# Patient Record
Sex: FEMALE | Race: BLACK OR AFRICAN AMERICAN | NOT HISPANIC OR LATINO | Employment: FULL TIME | URBAN - METROPOLITAN AREA
[De-identification: names, ages, dates, MRNs, and addresses within clinical notes are randomized per-mention and may not be internally consistent; named-entity substitution may affect disease eponyms.]

---

## 2017-09-20 ENCOUNTER — CLINICAL SUPPORT (OUTPATIENT)
Dept: URGENT CARE | Facility: CLINIC | Age: 22
End: 2017-09-20

## 2017-09-20 DIAGNOSIS — Z02.83 ENCOUNTER FOR DRUG SCREENING: Primary | ICD-10-CM

## 2017-09-20 PROCEDURE — 80306 DRUG TEST PRSMV INSTRMNT: CPT | Mod: S$GLB,,, | Performed by: PREVENTIVE MEDICINE

## 2019-09-04 ENCOUNTER — OFFICE VISIT (OUTPATIENT)
Dept: PLASTIC SURGERY | Facility: CLINIC | Age: 24
End: 2019-09-04
Payer: COMMERCIAL

## 2019-09-04 VITALS
SYSTOLIC BLOOD PRESSURE: 139 MMHG | DIASTOLIC BLOOD PRESSURE: 82 MMHG | WEIGHT: 255 LBS | BODY MASS INDEX: 43.54 KG/M2 | HEIGHT: 64 IN

## 2019-09-04 DIAGNOSIS — G89.29 CHRONIC NECK AND BACK PAIN: ICD-10-CM

## 2019-09-04 DIAGNOSIS — M54.2 CHRONIC NECK AND BACK PAIN: ICD-10-CM

## 2019-09-04 DIAGNOSIS — R21 RASH AND NONSPECIFIC SKIN ERUPTION: ICD-10-CM

## 2019-09-04 DIAGNOSIS — M54.9 CHRONIC UPPER BACK PAIN: ICD-10-CM

## 2019-09-04 DIAGNOSIS — N62 MACROMASTIA: Primary | ICD-10-CM

## 2019-09-04 DIAGNOSIS — Z3A.01 LESS THAN 8 WEEKS GESTATION OF PREGNANCY: ICD-10-CM

## 2019-09-04 DIAGNOSIS — M54.2 CHRONIC NECK PAIN: ICD-10-CM

## 2019-09-04 DIAGNOSIS — R21 EXCORIATED RASH: ICD-10-CM

## 2019-09-04 DIAGNOSIS — G89.29 CHRONIC NECK PAIN: ICD-10-CM

## 2019-09-04 DIAGNOSIS — M54.9 CHRONIC NECK AND BACK PAIN: ICD-10-CM

## 2019-09-04 DIAGNOSIS — G89.29 CHRONIC UPPER BACK PAIN: ICD-10-CM

## 2019-09-04 PROCEDURE — 99203 OFFICE O/P NEW LOW 30 MIN: CPT | Mod: S$GLB,,, | Performed by: SURGERY

## 2019-09-04 PROCEDURE — 99999 PR PBB SHADOW E&M-EST. PATIENT-LVL II: CPT | Mod: PBBFAC,,, | Performed by: SURGERY

## 2019-09-04 PROCEDURE — 99203 PR OFFICE/OUTPT VISIT, NEW, LEVL III, 30-44 MIN: ICD-10-PCS | Mod: S$GLB,,, | Performed by: SURGERY

## 2019-09-04 PROCEDURE — 99999 PR PBB SHADOW E&M-EST. PATIENT-LVL II: ICD-10-PCS | Mod: PBBFAC,,, | Performed by: SURGERY

## 2019-09-04 RX ORDER — NIFEDIPINE 30 MG/1
30 TABLET, FILM COATED, EXTENDED RELEASE ORAL DAILY
COMMUNITY
End: 2020-09-29 | Stop reason: SDUPTHER

## 2019-09-04 NOTE — PROGRESS NOTES
History & Physical  Plastic and Reconstructive Surgery  Breast Reduction Consult    SUBJECTIVE:   Chief complaint: large breasts    History of Present Illness:  Patient is a 23 y.o. female presents with symptomatic bilateral large pendulous  breasts.  Patient wears a 42 H bra size. States that she has back and neck pain for greater than 10 years. Patient has tried to treat back pain with the following: muscle relaxants, ibuprofen, and tylenol for this. Patient denies improvement with medication management of back pain.    Patient states she is active but her breast do prevent her from engaging in aerobic exercise. She also participates in exercise program to relieve back and neck pain. Denies improvement. Patient complains of shoulder grooving from kaykay straps and rashes under the breast for which she uses baby powder and cream, and triple oitment abx.   Also has macerating rashes during summer time. Patient wears special supportive bra's with wide straps. Patient reports her large breast and back pain affect her personal and professional life on a daily basis. Patient states she is in constat pain through out the work day.    Patient reports she found out she is pregnant earlier this month. She wanted to come in for the consult to discuss surgical options that would be available in the future. She understands she cannot have the surgery until she is finished breast feeding and should consider waiting until she is finished having children.    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 No pertinent past medical history.    History reviewed. No pertinent surgical history.    Family History   Problem Relation Age of Onset    Depression Mother     Hypertension Father     Diabetes Father         Social History     Socioeconomic History    Marital status: Single     Spouse name: Not on file    Number of children: Not on file    Years of education: Not on file    Highest education level: Not on file   Occupational History    Occupation: security at [employer]   Social Needs    Financial resource strain: Not on file    Food insecurity:     Worry: Not on file     Inability: Not on file    Transportation needs:     Medical: Not on file     Non-medical: Not on file   Tobacco Use    Smoking status: Never Smoker    Smokeless tobacco: Never Used   Substance and Sexual Activity    Alcohol use: Yes     Comment: occasionally    Drug use: No    Sexual activity: Not Currently   Lifestyle    Physical activity:     Days per week: Not on file     Minutes per session: Not on file    Stress: Not on file   Relationships    Social connections:     Talks on phone: Not on file     Gets together: Not on file     Attends [employer]ology service: Not on file     Active member of club or organization: Not on file     Attends meetings of clubs or organizations: Not on file     Relationship status: Not on file   Other Topics Concern    Not on file   Social History Narrative    Not on file       Current Outpatient Medications   Medication Sig Dispense Refill    NIFEdipine (ADALAT CC) 30 MG TbSR Take 30 mg by mouth once daily.      cetirizine (ZYRTEC) 10 MG tablet Take 10 mg by mouth once daily.      ferrous gluconate (FERGON) 325 MG Tab Take 1 tablet (325 mg total) by mouth 3 (three) times daily with meals. 90 tablet 6    fluoxetine (PROZAC) 40 MG capsule Take 1 capsule (40 mg total) by mouth once daily. 30 capsule 3     No current facility-administered medications for this visit.        Review of patient's allergies indicates:   Allergen Reactions    No known allergies          Review of Systems:    Review of Systems   HENT: Positive for neck pain.    Musculoskeletal: Positive for back pain.   Neurological:  "Positive for headaches. dizziness  Skin: Positive for rashes    Pertinent 12 point ROS negative except as listed above. She denies history of fatigue, weight change, eye problems, ear problems, hoarseness/voice change, chest discomfort, palpitations, vein inflammation, swollen feet, breathing problems, chronic cough, indigestion, trouble swallowing, abdominal pain, blood in stool, urinary problems, sexual problems, joint problems, back pain, musculoskeletal pain, dizziness, headaches, muscle weakness, trouble sleeping, abnormal bruising, abnormal thirst, abnormal sweating, depression, anxiety, or any prior psychiatry consultation.        OBJECTIVE:     /82   Ht 5' 4" (1.626 m)   Wt 115.7 kg (255 lb)   BMI 43.77 kg/m²     Physical Exam:    Physical Exam   Constitutional: She is oriented to person, place, and time. She appears well-developed and well-nourished.   Neck: Normal range of motion. Neck supple. No tracheal deviation present.   Cardiovascular: Normal rate, regular rhythm and normal heart sounds.    Pulmonary/Chest: Effort normal and breath sounds normal. bilaterally enlarged breasts, evidence of previous rashes, shoulder grooving, no palpable masses, nipple everted  Abdominal: Soft. Bowel sounds are normal.   Musculoskeletal: Normal range of motion.   Neurological: She is alert and oriented to person, place, and time.   Skin: Skin is warm.       Labs:    No results found for: HGBA1C      Lab Results   Component Value Date    WBC 5.39 03/16/2015    HGB 11.9 (L) 03/16/2015    HCT 34.4 (L) 03/16/2015    MCV 81 (L) 03/16/2015     03/16/2015     CMP  BMP  Lab Results   Component Value Date     03/10/2015    K 3.8 03/10/2015     03/10/2015    CO2 22 (L) 03/10/2015    BUN 11 03/10/2015    CREATININE 0.7 03/10/2015    CALCIUM 9.9 03/10/2015    ANIONGAP 11 03/10/2015    ESTGFRAFRICA >60.0 03/10/2015    EGFRNONAA >60.0 03/10/2015     Lab Results   Component Value Date    ALT 15 03/10/2015 "    AST 24 03/10/2015    ALKPHOS 47 (L) 03/10/2015    BILITOT 0.2 03/10/2015     No results found for: INR, PROTIME      ASSESSMENT/PLAN:     1.Symptomatic Bilateral Macromastia  2. Chronic neck pain  3. Chronic back pain  4. Chronic breast rashes  5. Pregnancy, 1st trimester    PLAN:  -Will need 1800 gm reduction per side  - Continue exercise plan and medication management for back pain  - Continue local skin care regimen for rashes  - Follow up with plastic surgery after patient has stopped breast feeding for at least 6 months.

## 2019-09-17 ENCOUNTER — OFFICE VISIT (OUTPATIENT)
Dept: OBSTETRICS AND GYNECOLOGY | Facility: CLINIC | Age: 24
End: 2019-09-17
Payer: COMMERCIAL

## 2019-09-17 VITALS
BODY MASS INDEX: 43.13 KG/M2 | WEIGHT: 252.63 LBS | SYSTOLIC BLOOD PRESSURE: 116 MMHG | HEIGHT: 64 IN | DIASTOLIC BLOOD PRESSURE: 80 MMHG

## 2019-09-17 DIAGNOSIS — F32.A DEPRESSION, UNSPECIFIED DEPRESSION TYPE: ICD-10-CM

## 2019-09-17 DIAGNOSIS — O02.1 MISSED AB: Primary | ICD-10-CM

## 2019-09-17 PROCEDURE — 99204 PR OFFICE/OUTPT VISIT, NEW, LEVL IV, 45-59 MIN: ICD-10-PCS | Mod: S$GLB,,, | Performed by: OBSTETRICS & GYNECOLOGY

## 2019-09-17 PROCEDURE — 99204 OFFICE O/P NEW MOD 45 MIN: CPT | Mod: S$GLB,,, | Performed by: OBSTETRICS & GYNECOLOGY

## 2019-09-17 PROCEDURE — 99999 PR PBB SHADOW E&M-EST. PATIENT-LVL II: CPT | Mod: PBBFAC,,, | Performed by: OBSTETRICS & GYNECOLOGY

## 2019-09-17 PROCEDURE — 99999 PR PBB SHADOW E&M-EST. PATIENT-LVL II: ICD-10-PCS | Mod: PBBFAC,,, | Performed by: OBSTETRICS & GYNECOLOGY

## 2019-09-17 RX ORDER — AMLODIPINE BESYLATE 5 MG/1
5 TABLET ORAL DAILY
Refills: 11 | COMMUNITY
Start: 2019-08-13 | End: 2020-09-29

## 2019-09-17 RX ORDER — FLUCONAZOLE 150 MG/1
TABLET ORAL
Refills: 0 | COMMUNITY
Start: 2019-07-10 | End: 2020-09-29

## 2019-09-17 RX ORDER — AMLODIPINE BESYLATE 5 MG/1
5 TABLET ORAL
COMMUNITY
Start: 2019-08-07 | End: 2020-08-06

## 2019-09-17 RX ORDER — FLUOXETINE HYDROCHLORIDE 20 MG/1
20 CAPSULE ORAL DAILY
Qty: 30 CAPSULE | Refills: 2 | Status: SHIPPED | OUTPATIENT
Start: 2019-09-17 | End: 2020-09-29 | Stop reason: SDUPTHER

## 2019-09-17 RX ORDER — LORATADINE 10 MG/1
10 TABLET ORAL
COMMUNITY
End: 2020-09-29 | Stop reason: SDUPTHER

## 2019-09-17 RX ORDER — ERGOCALCIFEROL 1.25 MG/1
50000 CAPSULE ORAL
COMMUNITY
Start: 2019-08-07 | End: 2019-11-05

## 2019-09-17 NOTE — PROGRESS NOTES
History & Physical  Gynecology      SUBJECTIVE:     Chief Complaint: Miscarriage       History of Present Illness:  Pt is a 24 y/o G1 who presents as follow up from a missed ab.  Pt reports that she was seen in [facility] ER on 9/10 with complaints of vaignal bleeding after finding out that she was pregnant.  While waiting for an assessment, pt reporst that she went to the bathroom and had very heavy vaginal bleeding and passage of a large amount of tissue.  Her Beta HCG was >56549 that that time.  An US was done that showed an incomplete AB.  No gestational sac was seen and it was presumed that she passed it prior to the ultrasound. (see Care Everywhere tab). Patient was not seen for follow up and then present to the ED on  for continued bleeding.  Beta at that time was 984.  Pt declined an ultrasound.  Pts bleeding was improved and she was discharged home.  Today, pt reports that bleeding is minimal and has almost stopped.  Reports that she has been sad since the miscarriage and is feeling depressed.  Would like to consider anti-depressants.  No SI/HI.  No fever/chills.  No pelvic pain.      Review of patient's allergies indicates:   Allergen Reactions    No known allergies        Past Medical History:   Diagnosis Date    Hypertension      History reviewed. No pertinent surgical history.  OB History        1    Para        Term                AB   1    Living           SAB   1    TAB        Ectopic        Multiple        Live Births                   Family History   Problem Relation Age of Onset    Depression Mother     Hypertension Father     Diabetes Father     Breast cancer Neg Hx     Colon cancer Neg Hx     Ovarian cancer Neg Hx      Social History     Tobacco Use    Smoking status: Never Smoker    Smokeless tobacco: Never Used   Substance Use Topics    Alcohol use: Yes     Comment: Occasionally.    Drug use: No       Current Outpatient Medications   Medication Sig     amLODIPine (NORVASC) 5 MG tablet Take 5 mg by mouth once daily.    amLODIPine (NORVASC) 5 MG tablet Take 5 mg by mouth.    ergocalciferol (ERGOCALCIFEROL) 50,000 unit Cap Take 50,000 Units by mouth.    fluconazole (DIFLUCAN) 150 MG Tab TAKE ONE TABLET BY MOUTH AS A ONE TIME DOSE    HYDROcodone-acetaminophen (NORCO) 5-325 mg per tablet Take 1 tablet by mouth every 4 (four) hours as needed.    loratadine (CLARITIN) 10 mg tablet Take 10 mg by mouth.    NIFEdipine (ADALAT CC) 30 MG TbSR Take 30 mg by mouth once daily.    FLUoxetine 20 MG capsule Take 1 capsule (20 mg total) by mouth once daily.     No current facility-administered medications for this visit.          Review of Systems:  Review of Systems   Constitutional: Negative for activity change, appetite change, chills, fatigue, fever and unexpected weight change.   Respiratory: Negative for cough, shortness of breath and wheezing.    Cardiovascular: Negative for chest pain and leg swelling.   Gastrointestinal: Negative for abdominal pain, constipation, diarrhea, nausea and vomiting.   Endocrine: Negative for hair loss and hot flashes.   Genitourinary: Positive for vaginal bleeding. Negative for decreased libido, dyspareunia, dysuria, frequency, menstrual problem, pelvic pain, vaginal discharge and vaginal pain.   Integumentary:  Negative for acne, hair changes, nipple discharge and breast skin changes.   Neurological: Negative for headaches.   Psychiatric/Behavioral: Negative for sleep disturbance.   Breast: Negative for mastodynia, nipple discharge and skin changes       OBJECTIVE:     Physical Exam:  Physical Exam   Constitutional: She is oriented to person, place, and time. She appears well-developed and well-nourished.   HENT:   Head: Normocephalic and atraumatic.   Eyes: Conjunctivae are normal. Right eye exhibits no discharge. Left eye exhibits no discharge. No scleral icterus.   Pulmonary/Chest: Effort normal. No stridor.   Abdominal: Soft. She  exhibits no distension. There is no tenderness.   Musculoskeletal: Normal range of motion.   Neurological: She is alert and oriented to person, place, and time.   Skin: Skin is warm and dry.   Psychiatric: She has a normal mood and affect. Her behavior is normal. Judgment and thought content normal.         ASSESSMENT:       ICD-10-CM ICD-9-CM    1. Missed ab O02.1 632 hCG, quantitative   2. Depression, unspecified depression type F32.9 311 FLUoxetine 20 MG capsule          Plan:      Christina was seen today for miscarriage.    Diagnoses and all orders for this visit:    Missed ab  - Pt with minimal bleeding today.  Beta has dropped from >54970 to 984.  - Pt needs to be followed with weekly beta HCG levels until her levels are down to a non-pregnancy level.   All questions were answered.     - Will get beta today and then weekly  -     hCG, quantitative; Standing    Depression, unspecified depression type  - Pt with appropriate sadness.  Has hx of depression  - Pt would like to restart Prozac.  Has been on it in the past.  Rx sent.  Given precautions.  -     FLUoxetine 20 MG capsule; Take 1 capsule (20 mg total) by mouth once daily.        Orders Placed This Encounter   Procedures    hCG, quantitative       Follow up if symptoms worsen or fail to improve.     Counseling time: 45 minutes    Connie Reynaga

## 2019-11-13 ENCOUNTER — OFFICE VISIT (OUTPATIENT)
Dept: PLASTIC SURGERY | Facility: CLINIC | Age: 24
End: 2019-11-13
Payer: COMMERCIAL

## 2019-11-13 VITALS
SYSTOLIC BLOOD PRESSURE: 133 MMHG | WEIGHT: 251.31 LBS | DIASTOLIC BLOOD PRESSURE: 86 MMHG | BODY MASS INDEX: 43.14 KG/M2 | HEART RATE: 73 BPM

## 2019-11-13 DIAGNOSIS — N62 MACROMASTIA: Primary | ICD-10-CM

## 2019-11-13 PROCEDURE — 99999 PR PBB SHADOW E&M-EST. PATIENT-LVL III: CPT | Mod: PBBFAC,,, | Performed by: PHYSICIAN ASSISTANT

## 2019-11-13 PROCEDURE — 99213 PR OFFICE/OUTPT VISIT, EST, LEVL III, 20-29 MIN: ICD-10-PCS | Mod: S$GLB,,, | Performed by: PHYSICIAN ASSISTANT

## 2019-11-13 PROCEDURE — 99999 PR PBB SHADOW E&M-EST. PATIENT-LVL III: ICD-10-PCS | Mod: PBBFAC,,, | Performed by: PHYSICIAN ASSISTANT

## 2019-11-13 PROCEDURE — 99213 OFFICE O/P EST LOW 20 MIN: CPT | Mod: S$GLB,,, | Performed by: PHYSICIAN ASSISTANT

## 2019-11-13 NOTE — PROGRESS NOTES
History & Physical  Plastic and Reconstructive Surgery  Breast Reduction Consult    SUBJECTIVE:   Chief complaint: large breasts    History of Present Illness:  Patient is a 24 y.o. female presents with symptomatic bilateral large pendulous breasts. She was seen on 09/04/2019 by Dr. Flaquito Manjarrez  To discuss bilateral breast reduction but at the time she was pregnant. She has since had a miscarriage. Patient wears a 42 H bra size. States that she has back and neck pain for greater than 10 years. Patient has tried to treat back pain with the following: muscle relaxants, ibuprofen, and tylenol for this. Patient denies improvement with medication management of back pain.    Patient states she is active but her breast do prevent her from engaging in aerobic exercise. She also participates in exercise program to relieve back and neck pain. She had lost approximately #40 pounds with diet and exercise since April 2019.  She desire to lose more weight, I stressed to the the importance of being within 5-10 min of her goal weight prior to any surgery. She voiced understanding.   She continue to complain of shoulder grooving from kaykay straps and rashes under the breast for which she uses baby powder and cream, and triple oitment abx.   Also has macerating rashes during summer time. Patient wears special supportive bra's with wide straps. Patient reports her large breast and back pain affect her personal and professional life on a daily basis. Patient states she is in constat pain through out the work day.    Patient reports she found out she is pregnant earlier this month. She wanted to come in for the consult to discuss surgical options that would be available in the future. She understands she cannot have the surgery until she is finished breast feeding and should consider waiting until she is finished having children.    FH:    Family history of breast cancer: Negative  Bleeding disorders: Negative         MH/anesthetic problems: Negative  Sickle Cell:Negative    SocHx:   - Work: dispatch at [employer]  - Tobacco Use/Exposure: Negative  - Alcohol: Negative    Past Medical History:   Diagnosis Date    Hypertension        History reviewed. No pertinent surgical history.    Family History   Problem Relation Age of Onset    Depression Mother     Hypertension Father     Diabetes Father     Breast cancer Neg Hx     Colon cancer Neg Hx     Ovarian cancer Neg Hx        Social History     Socioeconomic History    Marital status: Single     Spouse name: Not on file    Number of children: Not on file    Years of education: Not on file    Highest education level: Not on file   Occupational History    Occupation: security at [employer]   Social Needs    Financial resource strain: Not on file    Food insecurity:     Worry: Not on file     Inability: Not on file    Transportation needs:     Medical: Not on file     Non-medical: Not on file   Tobacco Use    Smoking status: Never Smoker    Smokeless tobacco: Never Used   Substance and Sexual Activity    Alcohol use: Yes     Comment: Occasionally.    Drug use: No    Sexual activity: Not Currently     Partners: Male     Birth control/protection: None   Lifestyle    Physical activity:     Days per week: Not on file     Minutes per session: Not on file    Stress: Not on file   Relationships    Social connections:     Talks on phone: Not on file     Gets together: Not on file     Attends Latter-day service: Not on file     Active member of club or organization: Not on file     Attends meetings of clubs or organizations: Not on file     Relationship status: Not on file   Other Topics Concern    Not on file   Social History Narrative    Not on file       Current Outpatient Medications   Medication Sig Dispense Refill    amLODIPine (NORVASC) 5 MG tablet Take 5 mg by mouth once daily.  11    amLODIPine (NORVASC) 5 MG tablet Take 5 mg by mouth.      fluconazole  (DIFLUCAN) 150 MG Tab TAKE ONE TABLET BY MOUTH AS A ONE TIME DOSE  0    FLUoxetine 20 MG capsule Take 1 capsule (20 mg total) by mouth once daily. 30 capsule 2    HYDROcodone-acetaminophen (NORCO) 5-325 mg per tablet Take 1 tablet by mouth every 4 (four) hours as needed. 18 tablet 0    loratadine (CLARITIN) 10 mg tablet Take 10 mg by mouth.      NIFEdipine (ADALAT CC) 30 MG TbSR Take 30 mg by mouth once daily.       No current facility-administered medications for this visit.        Review of patient's allergies indicates:   Allergen Reactions    No known allergies          Review of Systems:    Review of Systems   HENT: Positive for neck pain.    Musculoskeletal: Positive for back pain.   Neurological: Positive for headaches. dizziness  Skin: Positive for rashes    Pertinent 12 point ROS negative except as listed above. She denies history of fatigue, weight change, eye problems, ear problems, hoarseness/voice change, chest discomfort, palpitations, vein inflammation, swollen feet, breathing problems, chronic cough, indigestion, trouble swallowing, abdominal pain, blood in stool, urinary problems, sexual problems, joint problems, back pain, musculoskeletal pain, dizziness, headaches, muscle weakness, trouble sleeping, abnormal bruising, abnormal thirst, abnormal sweating, depression, anxiety, or any prior psychiatry consultation.        OBJECTIVE:     /86   Pulse 73   Wt 114 kg (251 lb 5.2 oz)   LMP 11/13/2019   BMI 43.14 kg/m²     Physical Exam:    Physical Exam   Constitutional: She is oriented to person, place, and time. She appears well-developed and well-nourished.   Neck: Normal range of motion. Neck supple. No tracheal deviation present.   Cardiovascular: Normal rate, regular rhythm and normal heart sounds.    Pulmonary/Chest: Effort normal and breath sounds normal. bilaterally enlarged breasts, evidence of previous rashes, shoulder grooving, no palpable masses, nipple everted  Abdominal: Soft.  Bowel sounds are normal.   Musculoskeletal: Normal range of motion.   Neurological: She is alert and oriented to person, place, and time.   Skin: Skin is warm.       Labs:    No results found for: HGBA1C      Lab Results   Component Value Date    WBC 6.50 09/12/2019    HGB 11.0 (L) 09/12/2019    HCT 33.4 (L) 09/12/2019    MCV 84 09/12/2019     09/12/2019     CMP  BMP  Lab Results   Component Value Date     09/12/2019    K 3.9 09/12/2019     09/12/2019    CO2 26 09/12/2019    BUN 9 09/12/2019    CREATININE 0.5 09/12/2019    CALCIUM 9.1 09/12/2019    ANIONGAP 7 (L) 09/12/2019    ESTGFRAFRICA >60.0 09/12/2019    EGFRNONAA >60.0 09/12/2019     Lab Results   Component Value Date    ALT 16 09/12/2019    AST 21 09/12/2019    ALKPHOS 52 09/12/2019    BILITOT 0.4 09/12/2019     No results found for: INR, PROTIME      ASSESSMENT/PLAN:     1.Symptomatic Bilateral Macromastia  2. Chronic neck pain  3. Chronic back pain  4. Chronic breast rashes  5. Pregnancy, 1st trimester    PLAN:  -Will submit for insurance authorization for bilateral breast reduction, will need 1800 gm reduction per side  - Continue exercise plan and medication management for back pain  - Continue local skin care regimen for rashes  - Patient had miscarriage in late 09/2019, does not desire another pregnancy  - Will send for medical photography

## 2020-09-01 ENCOUNTER — TELEPHONE (OUTPATIENT)
Dept: OBSTETRICS AND GYNECOLOGY | Facility: CLINIC | Age: 25
End: 2020-09-01

## 2020-09-01 DIAGNOSIS — Z36.3 ENCOUNTER FOR ANTENATAL SCREENING FOR MALFORMATION USING ULTRASOUND: Primary | ICD-10-CM

## 2020-09-29 ENCOUNTER — PROCEDURE VISIT (OUTPATIENT)
Dept: OBSTETRICS AND GYNECOLOGY | Facility: CLINIC | Age: 25
End: 2020-09-29
Payer: COMMERCIAL

## 2020-09-29 ENCOUNTER — OFFICE VISIT (OUTPATIENT)
Dept: OBSTETRICS AND GYNECOLOGY | Facility: CLINIC | Age: 25
End: 2020-09-29
Payer: COMMERCIAL

## 2020-09-29 VITALS
HEIGHT: 64 IN | DIASTOLIC BLOOD PRESSURE: 80 MMHG | BODY MASS INDEX: 45.51 KG/M2 | WEIGHT: 266.56 LBS | SYSTOLIC BLOOD PRESSURE: 152 MMHG

## 2020-09-29 DIAGNOSIS — O99.211 OBESITY AFFECTING PREGNANCY IN FIRST TRIMESTER: ICD-10-CM

## 2020-09-29 DIAGNOSIS — Z36.3 ENCOUNTER FOR ANTENATAL SCREENING FOR MALFORMATION USING ULTRASOUND: ICD-10-CM

## 2020-09-29 DIAGNOSIS — Z32.01 POSITIVE PREGNANCY TEST: ICD-10-CM

## 2020-09-29 DIAGNOSIS — O16.1 HYPERTENSION AFFECTING PREGNANCY IN FIRST TRIMESTER: ICD-10-CM

## 2020-09-29 DIAGNOSIS — F32.A DEPRESSION, UNSPECIFIED DEPRESSION TYPE: ICD-10-CM

## 2020-09-29 DIAGNOSIS — Z12.4 PAP SMEAR FOR CERVICAL CANCER SCREENING: ICD-10-CM

## 2020-09-29 DIAGNOSIS — N91.4 SECONDARY OLIGOMENORRHEA: Primary | ICD-10-CM

## 2020-09-29 LAB
B-HCG UR QL: POSITIVE
CREAT UR-MCNC: 39 MG/DL (ref 15–325)
CTP QC/QA: YES
PROT UR-MCNC: <7 MG/DL (ref 0–15)
PROT/CREAT UR: NORMAL MG/G{CREAT} (ref 0–0.2)

## 2020-09-29 PROCEDURE — 76802 OB US < 14 WKS ADDL FETUS: CPT | Mod: S$GLB,,, | Performed by: OBSTETRICS & GYNECOLOGY

## 2020-09-29 PROCEDURE — 76801 OB US < 14 WKS SINGLE FETUS: CPT | Mod: S$GLB,,, | Performed by: OBSTETRICS & GYNECOLOGY

## 2020-09-29 PROCEDURE — 87086 URINE CULTURE/COLONY COUNT: CPT

## 2020-09-29 PROCEDURE — 99999 PR PBB SHADOW E&M-EST. PATIENT-LVL III: ICD-10-PCS | Mod: PBBFAC,,, | Performed by: NURSE PRACTITIONER

## 2020-09-29 PROCEDURE — 88175 CYTOPATH C/V AUTO FLUID REDO: CPT

## 2020-09-29 PROCEDURE — 76801 PR US, OB <14WKS, TRANSABD, SINGLE GESTATION: ICD-10-PCS | Mod: S$GLB,,, | Performed by: OBSTETRICS & GYNECOLOGY

## 2020-09-29 PROCEDURE — 76802 PR US, OB <14WKS, TRANSABD, EA ADDL GESTATION: ICD-10-PCS | Mod: S$GLB,,, | Performed by: OBSTETRICS & GYNECOLOGY

## 2020-09-29 PROCEDURE — 87491 CHLMYD TRACH DNA AMP PROBE: CPT

## 2020-09-29 PROCEDURE — 0500F INITIAL PRENATAL CARE VISIT: CPT | Mod: S$GLB,,, | Performed by: NURSE PRACTITIONER

## 2020-09-29 PROCEDURE — 84156 ASSAY OF PROTEIN URINE: CPT

## 2020-09-29 PROCEDURE — 0500F PR INITIAL PRENATAL CARE VISIT: ICD-10-PCS | Mod: S$GLB,,, | Performed by: NURSE PRACTITIONER

## 2020-09-29 PROCEDURE — 99999 PR PBB SHADOW E&M-EST. PATIENT-LVL III: CPT | Mod: PBBFAC,,, | Performed by: NURSE PRACTITIONER

## 2020-09-29 RX ORDER — FLUOXETINE HYDROCHLORIDE 20 MG/1
20 CAPSULE ORAL DAILY
Qty: 30 CAPSULE | Refills: 11 | Status: SHIPPED | OUTPATIENT
Start: 2020-09-29 | End: 2020-11-10

## 2020-09-29 RX ORDER — LORATADINE 10 MG/1
10 TABLET ORAL DAILY PRN
Qty: 30 TABLET | Refills: 6 | Status: ON HOLD | OUTPATIENT
Start: 2020-09-29 | End: 2020-12-04 | Stop reason: SDUPTHER

## 2020-09-29 RX ORDER — NIFEDIPINE 30 MG/1
30 TABLET, FILM COATED, EXTENDED RELEASE ORAL DAILY
Qty: 30 TABLET | Refills: 6 | Status: SHIPPED | OUTPATIENT
Start: 2020-09-29 | End: 2021-04-05 | Stop reason: SDUPTHER

## 2020-09-29 NOTE — PROGRESS NOTES
"  CC: Positive Pregnancy Test    HISTORY OF PRESENT ILLNESS:    Christina Stevens is a 24 y.o. female, ,  Presents today for a routine exam complaining of amenorrhea and positive home urine pregnancy test.  Patient's last menstrual period was 2020.  History of depression/anxiety-stopped Prozac but wants to restart, SAB x 1, hypertension-has not taken meds since January since she ran out of rx, was on Norvasc and Procardia, takes Claritin daily for allergies. One episode of emesis-eating crackers helps with nausea. Here with her partner, excited about pregnancy-dating US is later today. One episode of spotting-no VB today. No pelvic pain. No longer working night shift. Has never gotten a flu shot but will get it for the pregnancy.     LMP: 20  EGA: 9w0d  EDC: 21    ROS:  GENERAL: No weight changes. No swelling. No fatigue. No fever.  CARDIOVASCULAR: No chest pain. No shortness of breath. No leg cramps.   NEUROLOGICAL: No headaches. No vision changes.  BREASTS: No pain. No lumps. No discharge.  ABDOMEN: No pain. No diarrhea. No constipation.  REPRODUCTIVE: No abnormal bleeding.   VULVA: No pain. No lesions. No itching.  VAGINA: No relaxation. No itching. No odor. No discharge. No lesions.  URINARY: No incontinence. No nocturia. No frequency. No dysuria.    MEDICATIONS AND ALLERGIES:  Reviewed    COMPREHENSIVE GYN HISTORY:  PAP History: Denies abnormal Paps.  Infection History: Denies STDs. Denies PID.  Benign History: Denies uterine fibroids. Denies ovarian cysts. Denies endometriosis. Denies other conditions.  Cancer History: Denies cervical cancer. Denies uterine cancer or hyperplasia. Denies ovarian cancer. Denies vulvar cancer or pre-cancer. Denies vaginal cancer or pre-cancer. Denies breast cancer. Denies colon cancer.  Sexual Activity History: Reports currently being sexually active  Menstrual History: None.  Contraception: None    Ht 5' 4" (1.626 m)   Wt 120.9 kg (266 lb 8.6 oz)   LMP " 2020   BMI 45.75 kg/m²     PE:  AFFECT: Calm, alert and oriented X 3. Interactive during exam  GENERAL: Appears well-nourished, well-developed, in no acute distress.  HEAD: Normocephalic, atruamatic  TEETH: Good dentition.  THYROID: No thyromegally   BREASTS: No masses, skin changes, nipple discharge or adenopathy bilaterally.  SKIN: Normal for race, warm, & dry. No lesions or rashes.  LUNGS: Easy and unlabored, clear to auscultation bilaterally.  HEART: Regular rate and rhythm   ABDOMEN: Soft and nontender without masses or organomegally.  VULVA: No lesions, masses or tenderness.  VAGINA: Moist and well rugated without lesions or discharge.  CERVIX: Moist and pink without lesions, discharge or tenderness.      UTERUS SIZE: 8 week size, nontender and without masses.  ADNEXA: No masses or tenderness.  ESTIMATE OF PELVIC CAPACITY: Adequate  EXTREMITIES: No cyanosis, clubbing or edema. No calf tenderness.  LYMPH NODES: No axillary or inguinal adenopathy.    PROCEDURES:  UPT Positive  Genprobe  Pap    ASSESSMENT/PLAN:  Amenorrhea  Positive urine pregnancy test   -  Routine prenatal care    Nausea and vomiting in pregnancy    -  Education regarding lifestyle and dietary modifications    -  Advised use of B6/Unisom. Pt will notify us if no relief/worsening symptoms, will consider Zofran if needed.    1st TRIMESTER COUNSELING: Discussed all, booklet provided:  Common complaints of pregnancy  HIV and other routine prenatal tests including  genetic screening  Risk factors identified by prenatal history  Oriented to practice - discussed anticipated course of prenatal care & indications for Ultrasound  Childbirth classes/Hospital facilities   Nutrition and weight gain counseling  Toxoplasmosis precautions (Cats/Raw Meat)  Sexual activity and exercise  Environmental/Work hazards  Travel  Tobacco (Ask, Advise, Assess, Assist, and Arrange), as well as alcohol and drug use  Use of any medications (Including  supplements, Vitamins, Herbs, or OTC Drugs)  Domestic violence  Seat belt use    TERATOLOGY COUNSELING:   Discussed indications and options for aneuploidy screening - pamphlets given    FOLLOW-UP in 4 weeks with Dr. Juhi Alonso pap on file-Pap updated today  IOB labs, flu shot, and SPADD next visit  Dating US today  Needs early GTT 2/2 BMI 45  Claritin, Prozac, and Procardia refilled, OTC prenatal  Baseline p/c ratio today, discuss starting daily baby aspirin at 12 weeks    Maria Del Carmen Corbin NP  OB/GYN

## 2020-09-29 NOTE — PATIENT INSTRUCTIONS
[facility] DELIV[facility] PHONE NUMBER, [phone] (OPEN 24/7, LOCATED ON 6TH FLOOR OF HOSPITAL)  [address] PHONE NUMBER, [phone] (OPEN MON-FRI, 8a-5p)

## 2020-09-30 LAB — BACTERIA UR CULT: NO GROWTH

## 2020-10-06 ENCOUNTER — PATIENT MESSAGE (OUTPATIENT)
Dept: OBSTETRICS AND GYNECOLOGY | Facility: CLINIC | Age: 25
End: 2020-10-06

## 2020-10-14 ENCOUNTER — PATIENT MESSAGE (OUTPATIENT)
Dept: OBSTETRICS AND GYNECOLOGY | Facility: CLINIC | Age: 25
End: 2020-10-14

## 2020-10-16 ENCOUNTER — OFFICE VISIT (OUTPATIENT)
Dept: URGENT CARE | Facility: CLINIC | Age: 25
End: 2020-10-16
Payer: MEDICAID

## 2020-10-16 VITALS
RESPIRATION RATE: 20 BRPM | DIASTOLIC BLOOD PRESSURE: 90 MMHG | HEIGHT: 64 IN | HEART RATE: 112 BPM | WEIGHT: 250 LBS | TEMPERATURE: 97 F | SYSTOLIC BLOOD PRESSURE: 163 MMHG | BODY MASS INDEX: 42.68 KG/M2

## 2020-10-16 DIAGNOSIS — M54.9 BACK PAIN, UNSPECIFIED BACK LOCATION, UNSPECIFIED BACK PAIN LATERALITY, UNSPECIFIED CHRONICITY: ICD-10-CM

## 2020-10-16 DIAGNOSIS — Z34.90 PREGNANCY, UNSPECIFIED GESTATIONAL AGE: ICD-10-CM

## 2020-10-16 DIAGNOSIS — N30.00 ACUTE CYSTITIS WITHOUT HEMATURIA: Primary | ICD-10-CM

## 2020-10-16 LAB
B-HCG UR QL: POSITIVE
BILIRUB UR QL STRIP: NEGATIVE
CTP QC/QA: YES
GLUCOSE UR QL STRIP: NEGATIVE
KETONES UR QL STRIP: NEGATIVE
LEUKOCYTE ESTERASE UR QL STRIP: POSITIVE
PH, POC UA: 6 (ref 5–8)
POC BLOOD, URINE: NEGATIVE
POC NITRATES, URINE: NEGATIVE
PROT UR QL STRIP: NEGATIVE
SP GR UR STRIP: 1.02 (ref 1–1.03)
UROBILINOGEN UR STRIP-ACNC: ABNORMAL (ref 0.1–1.1)

## 2020-10-16 PROCEDURE — 99203 PR OFFICE/OUTPT VISIT, NEW, LEVL III, 30-44 MIN: ICD-10-PCS | Mod: 25,S$GLB,, | Performed by: NURSE PRACTITIONER

## 2020-10-16 PROCEDURE — 99000 PR SPECIMEN HANDLING,DR OFF->LAB: ICD-10-PCS | Mod: S$GLB,,, | Performed by: NURSE PRACTITIONER

## 2020-10-16 PROCEDURE — 99203 OFFICE O/P NEW LOW 30 MIN: CPT | Mod: 25,S$GLB,, | Performed by: NURSE PRACTITIONER

## 2020-10-16 PROCEDURE — 81003 URINALYSIS AUTO W/O SCOPE: CPT | Mod: QW,S$GLB,, | Performed by: NURSE PRACTITIONER

## 2020-10-16 PROCEDURE — 99000 SPECIMEN HANDLING OFFICE-LAB: CPT | Mod: S$GLB,,, | Performed by: NURSE PRACTITIONER

## 2020-10-16 PROCEDURE — 87086 URINE CULTURE/COLONY COUNT: CPT

## 2020-10-16 PROCEDURE — 81003 POCT URINALYSIS, DIPSTICK, AUTOMATED, W/O SCOPE: ICD-10-PCS | Mod: QW,S$GLB,, | Performed by: NURSE PRACTITIONER

## 2020-10-16 RX ORDER — ACETAMINOPHEN AND CODEINE PHOSPHATE 120; 12 MG/5ML; MG/5ML
0.35 SOLUTION ORAL
COMMUNITY
Start: 2019-12-10 | End: 2020-11-10

## 2020-10-16 RX ORDER — CEPHALEXIN 500 MG/1
500 CAPSULE ORAL EVERY 6 HOURS
Qty: 28 CAPSULE | Refills: 0 | Status: SHIPPED | OUTPATIENT
Start: 2020-10-16 | End: 2020-10-23

## 2020-10-16 RX ORDER — CEPHALEXIN 500 MG/1
500 CAPSULE ORAL EVERY 6 HOURS
Qty: 28 CAPSULE | Refills: 0 | Status: SHIPPED | OUTPATIENT
Start: 2020-10-16 | End: 2020-10-16

## 2020-10-17 NOTE — PROGRESS NOTES
"Subjective:       Patient ID: Christina Stevens is a 24 y.o. female.    Vitals:  height is 5' 4" (1.626 m) and weight is 113.4 kg (250 lb). Her temperature is 96.9 °F (36.1 °C). Her blood pressure is 163/90 (abnormal) and her pulse is 112 (abnormal). Her respiration is 20.     Chief Complaint: Back Pain    Patient presents 11-12 weeks pregnant with left lower back/buttock pain x2 weeks.  States that she is taking Tylenol and using stretching and massage with no relief.  Notes that she recently started Epsom salt baths due to pain and does not normally do this and thinks that maybe that may have brought on a UTI with dysuria starting today.  Denies any vaginal bleeding or pelvic/abdominal cramping.  Denies fever or chills.  Denies trauma or injury.  Denies any numbness, weakness, loss of bowel/bladder control.    Back Pain  This is a recurrent problem. The current episode started 1 to 4 weeks ago. The problem occurs constantly. The problem is unchanged. The quality of the pain is described as aching. The pain is at a severity of 10/10. The pain is the same all the time. The symptoms are aggravated by position, standing, sitting and lying down. Stiffness is present all day. Associated symptoms include dysuria. Pertinent negatives include no abdominal pain, bladder incontinence, bowel incontinence, chest pain, fever, headaches, leg pain, numbness, paresis, paresthesias, pelvic pain, perianal numbness, tingling, weakness or weight loss. She has tried heat for the symptoms.   Dysuria   This is a new problem. The current episode started acute onset. The quality of the pain is described as burning. The pain is mild. There has been no fever. She is sexually active. There is no history of pyelonephritis. Associated symptoms include frequency, urgency and bubble bath use. Pertinent negatives include no behavior changes, chills, discharge, flank pain, hematuria, hesitancy, nausea, possible pregnancy, sweats, vomiting, weight loss, " constipation, rash or withholding. She has tried acetaminophen for the symptoms. The treatment provided no relief. There is no history of recurrent UTIs.       Constitution: Negative for chills, fatigue and fever.   HENT: Negative for congestion and sore throat.    Neck: Negative for painful lymph nodes.   Cardiovascular: Negative for chest pain and leg swelling.   Eyes: Negative for double vision and blurred vision.   Respiratory: Negative for cough and shortness of breath.    Gastrointestinal: Negative for abdominal pain, nausea, vomiting, constipation, diarrhea and bowel incontinence.   Genitourinary: Positive for dysuria, frequency and urgency. Negative for flank pain, bladder incontinence, hematuria, history of kidney stones, vaginal pain, vaginal discharge, vaginal bleeding, vaginal odor and pelvic pain.   Musculoskeletal: Positive for pain and back pain. Negative for joint pain, joint swelling, muscle cramps and muscle ache.   Skin: Negative for color change, pale, rash and bruising.   Allergic/Immunologic: Negative for seasonal allergies.   Neurological: Negative for dizziness, history of vertigo, light-headedness, passing out, headaches and numbness.   Hematologic/Lymphatic: Negative for swollen lymph nodes.   Psychiatric/Behavioral: Negative for nervous/anxious, sleep disturbance and depression. The patient is not nervous/anxious.        Objective:      Physical Exam   Constitutional: She is oriented to person, place, and time. She appears well-developed. She is cooperative.  Non-toxic appearance. She does not appear ill. No distress.   HENT:   Head: Normocephalic and atraumatic.   Nose: Nose normal.   Mouth/Throat: Oropharynx is clear and moist and mucous membranes are normal.   Eyes: Conjunctivae and lids are normal.   Neck: Trachea normal, normal range of motion, full passive range of motion without pain and phonation normal. Neck supple.   Cardiovascular: Normal rate, regular rhythm, normal heart  sounds and normal pulses.   Pulmonary/Chest: Effort normal and breath sounds normal.   Abdominal: Soft. Normal appearance and bowel sounds are normal. She exhibits no abdominal bruit, no pulsatile midline mass and no mass.   Musculoskeletal:         General: No deformity.      Lumbar back: She exhibits pain. She exhibits normal range of motion, no tenderness, no bony tenderness, no swelling, no edema, no deformity, no laceration, no spasm and normal pulse.        Back:       Comments: Pain to left buttock with no tenderness.  Full ROM.  No rash or swelling.  Negative straight leg raise test.  +2 pulses.  +2 DTRs.  Good strength and sensation distally.  There is no CVA or abdominal tenderness.   Neurological: She is alert and oriented to person, place, and time. She has normal strength and normal reflexes. No sensory deficit.   Skin: Skin is warm, dry, intact and not diaphoretic. Psychiatric: Her speech is normal and behavior is normal. Judgment and thought content normal.   Nursing note and vitals reviewed.    Results for orders placed or performed in visit on 10/16/20   POCT Urinalysis, Dipstick, Automated, W/O Scope   Result Value Ref Range    POC Blood, Urine Negative Negative    POC Bilirubin, Urine Negative Negative    POC Urobilinogen, Urine norm 0.1 - 1.1    POC Ketones, Urine Negative Negative    POC Protein, Urine Negative Negative    POC Nitrates, Urine Negative Negative    POC Glucose, Urine Negative Negative    pH, UA 6 5 - 8    POC Specific Gravity, Urine 1.020 1.003 - 1.029    POC Leukocytes, Urine Positive (A) Negative   POCT urine pregnancy   Result Value Ref Range    POC Preg Test, Ur Positive (A) Negative     Acceptable Yes          Assessment:       1. Acute cystitis without hematuria    2. Back pain, unspecified back location, unspecified back pain laterality, unspecified chronicity    3. Pregnancy, unspecified gestational age        Plan:       Lab reviewed.  Recent urine culture  "on 9/29/20 reviewed and was negative.  Will repeat.  Acute cystitis without hematuria  -     Urine culture  -     Discontinue: cephALEXin (KEFLEX) 500 MG capsule; Take 1 capsule (500 mg total) by mouth every 6 (six) hours. for 7 days  Dispense: 28 capsule; Refill: 0  -     cephALEXin (KEFLEX) 500 MG capsule; Take 1 capsule (500 mg total) by mouth every 6 (six) hours. for 7 days  Dispense: 28 capsule; Refill: 0    Back pain, unspecified back location, unspecified back pain laterality, unspecified chronicity  -     POCT Urinalysis, Dipstick, Automated, W/O Scope  -     POCT urine pregnancy    Pregnancy, unspecified gestational age      Patient Instructions     Okay to continue with stretches and Tylenol otc as directed.  Okay to also continue with massage.  Follow up closely with your OB.  Keflex as directed.  Limit cold drinks and increase water.  Go to the ER for any worsening symptoms including fever, vaginal bleeding, or abdominal pain/cramping.  Bladder Infection, Female (Adult)    Urine is normally doesn't have any bacteria in it. But bacteria can get into the urinary tract from the skin around the rectum. Or they can travel in the blood from elsewhere in the body. Once they are in your urinary tract, they can cause infection in the urethra (urethritis), the bladder (cystitis), or the kidneys (pyelonephritis).  The most common place for an infection is in the bladder. This is called a bladder infection. This is one of the most common infections in women. Most bladder infections are easily treated. They are not serious unless the infection spreads to the kidney.  The phrases "bladder infection," "UTI," and "cystitis" are often used to describe the same thing. But they are not always the same. Cystitis is an inflammation of the bladder. The most common cause of cystitis is an infection.  Symptoms  The infection causes inflammation in the urethra and bladder. This causes many of the symptoms. The most common " symptoms of a bladder infection are:  · Pain or burning when urinating  · Having to urinate more often than usual  · Urgent need to urinate  · Only a small amount of urine comes out  · Blood in urine  · Abdominal discomfort. This is usually in the lower abdomen above the pubic bone.  · Cloudy urine  · Strong- or bad-smelling urine  · Unable to urinate (urinary retention)  · Unable to hold urine in (urinary incontinence)  · Fever  · Loss of appetite  · Confusion (in older adults)  Causes  Bladder infections are not contagious. You can't get one from someone else, from a toilet seat, or from sharing a bath.  The most common cause of bladder infections is bacteria from the bowels. The bacteria get onto the skin around the opening of the urethra. From there, they can get into the urine and travel up to the bladder, causing inflammation and infection. This usually happens because of:  · Wiping improperly after urinating. Always wipe from front to back.  · Bowel incontinence  · Pregnancy  · Procedures such as having a catheter inserted  · Older age  · Not emptying your bladder. This can allow bacteria a chance to grow in your urine.  · Dehydration  · Constipation  · Sex  · Use of a diaphragm for birth control   Treatment  Bladder infections are diagnosed by a urine test. They are treated with antibiotics and usually clear up quickly without complications. Treatment helps prevent a more serious kidney infection.  Medicines  Medicines can help in the treatment of a bladder infection:  · Take antibiotics until they are used up, even if you feel better. It is important to finish them to make sure the infection has cleared.  · You can use acetaminophen or ibuprofen for pain, fever, or discomfort, unless another medicine was prescribed. If you have chronic liver or kidney disease, talk with your healthcare provider before using these medicines. Also talk with your provider if you've ever had a stomach ulcer or gastrointestinal  bleeding, or are taking blood-thinner medicines.  · If you are given phenazopydridine to reduce burning with urination, it will cause your urine to become a bright orange color. This can stain clothing.  Care and prevention  These self-care steps can help prevent future infections:  · Drink plenty of fluids to prevent dehydration and flush out your bladder. Do this unless you must restrict fluids for other health reasons, or your doctor told you not to.  · Proper cleaning after going to the bathroom is important. Wipe from front to back after using the toilet to prevent the spread of bacteria.  · Urinate more often. Don't try to hold urine in for a long time.  · Wear loose-fitting clothes and cotton underwear. Avoid tight-fitting pants.  · Improve your diet and prevent constipation. Eat more fresh fruit and vegetables, and fiber, and less junk and fatty foods.  · Avoid sex until your symptoms are gone.  · Avoid caffeine, alcohol, and spicy foods. These can irritate your bladder.  · Urinate right after intercourse to flush out your bladder.  · If you use birth control pills and have frequent bladder infections, discuss it with your doctor.  Follow-up care  Call your healthcare provider if all symptoms are not gone after 3 days of treatment. This is especially important if you have repeat infections.  If a culture was done, you will be told if your treatment needs to be changed. If directed, you can call to find out the results.  If X-rays were done, you will be told if the results will affect your treatment.  Call 911  Call 911 if any of the following occur:  · Trouble breathing  · Hard to wake up or confusion  · Fainting or loss of consciousness  · Rapid heart rate  When to seek medical advice  Call your healthcare provider right away if any of these occur:  · Fever of 100.4ºF (38.0ºC) or higher, or as directed by your healthcare provider  · Symptoms are not better by the third day of treatment  · Back or belly  (abdominal) pain that gets worse  · Repeated vomiting, or unable to keep medicine down  · Weakness or dizziness  · Vaginal discharge  · Pain, redness, or swelling in the outer vaginal area (labia)  Date Last Reviewed: 10/1/2016  © 6555-6929 [employer]. [address] [place] [address]. All rights reserved. This information is not intended as a substitute for professional medical care. Always follow your healthcare professional's instructions.        Pelvic Tilt, Leg Lift for Back Pain During Pregnancy  Before trying these exercises, talk to your healthcare provider to make sure they are safe for you. Ask your healthcare provider how many times to do each exercise.      Pelvic tilt Leg lift   Pelvic tilt  This exercise stretches muscles in your buttocks and lower back. It also strengthens your stomach and helps set up good posture.  1. Get on your hands and knees with your back straight. A mat can help cushion your knees.  2. Try to pull your stomach muscles in. Tuck in your buttocks. This will tilt your pelvis up. As your pelvis tilts, your back will rise toward the ceiling.  3. Hold and count to 5, then relax.  Leg lifts  This strengthens the muscles of your back, buttocks, and stomach.  1. Get down on your hands and knees. Put your arms directly under your shoulders. Keep your knees shoulder-width apart.  2. Round your back. Then lift your left knee and gently bring it toward your elbow. Look at your knee as you raise it. (Stop moving your knee if you feel pressure in your stomach.)  3. Keeping your knee slightly bent, extend your leg. Lift your leg until you feel a stretch in your low back. Dont lift your leg higher than your hip.  4. Hold for 5 counts, then lower your left leg. Repeat the exercise with your right leg.  Date Last Reviewed: 8/16/2015  © 0419-3256 [employer]. [address] [place] [address]. All rights reserved. This information is not intended as a  substitute for professional medical care. Always follow your healthcare professional's instructions.

## 2020-10-17 NOTE — PATIENT INSTRUCTIONS
"Okay to continue with stretches and Tylenol otc as directed.  Okay to also continue with massage.  Follow up closely with your OB.  Keflex as directed.  Limit cold drinks and increase water.  Go to the ER for any worsening symptoms including fever, vaginal bleeding, or abdominal pain/cramping.  Bladder Infection, Female (Adult)    Urine is normally doesn't have any bacteria in it. But bacteria can get into the urinary tract from the skin around the rectum. Or they can travel in the blood from elsewhere in the body. Once they are in your urinary tract, they can cause infection in the urethra (urethritis), the bladder (cystitis), or the kidneys (pyelonephritis).  The most common place for an infection is in the bladder. This is called a bladder infection. This is one of the most common infections in women. Most bladder infections are easily treated. They are not serious unless the infection spreads to the kidney.  The phrases "bladder infection," "UTI," and "cystitis" are often used to describe the same thing. But they are not always the same. Cystitis is an inflammation of the bladder. The most common cause of cystitis is an infection.  Symptoms  The infection causes inflammation in the urethra and bladder. This causes many of the symptoms. The most common symptoms of a bladder infection are:  · Pain or burning when urinating  · Having to urinate more often than usual  · Urgent need to urinate  · Only a small amount of urine comes out  · Blood in urine  · Abdominal discomfort. This is usually in the lower abdomen above the pubic bone.  · Cloudy urine  · Strong- or bad-smelling urine  · Unable to urinate (urinary retention)  · Unable to hold urine in (urinary incontinence)  · Fever  · Loss of appetite  · Confusion (in older adults)  Causes  Bladder infections are not contagious. You can't get one from someone else, from a toilet seat, or from sharing a bath.  The most common cause of bladder infections is bacteria " from the bowels. The bacteria get onto the skin around the opening of the urethra. From there, they can get into the urine and travel up to the bladder, causing inflammation and infection. This usually happens because of:  · Wiping improperly after urinating. Always wipe from front to back.  · Bowel incontinence  · Pregnancy  · Procedures such as having a catheter inserted  · Older age  · Not emptying your bladder. This can allow bacteria a chance to grow in your urine.  · Dehydration  · Constipation  · Sex  · Use of a diaphragm for birth control   Treatment  Bladder infections are diagnosed by a urine test. They are treated with antibiotics and usually clear up quickly without complications. Treatment helps prevent a more serious kidney infection.  Medicines  Medicines can help in the treatment of a bladder infection:  · Take antibiotics until they are used up, even if you feel better. It is important to finish them to make sure the infection has cleared.  · You can use acetaminophen or ibuprofen for pain, fever, or discomfort, unless another medicine was prescribed. If you have chronic liver or kidney disease, talk with your healthcare provider before using these medicines. Also talk with your provider if you've ever had a stomach ulcer or gastrointestinal bleeding, or are taking blood-thinner medicines.  · If you are given phenazopydridine to reduce burning with urination, it will cause your urine to become a bright orange color. This can stain clothing.  Care and prevention  These self-care steps can help prevent future infections:  · Drink plenty of fluids to prevent dehydration and flush out your bladder. Do this unless you must restrict fluids for other health reasons, or your doctor told you not to.  · Proper cleaning after going to the bathroom is important. Wipe from front to back after using the toilet to prevent the spread of bacteria.  · Urinate more often. Don't try to hold urine in for a long  time.  · Wear loose-fitting clothes and cotton underwear. Avoid tight-fitting pants.  · Improve your diet and prevent constipation. Eat more fresh fruit and vegetables, and fiber, and less junk and fatty foods.  · Avoid sex until your symptoms are gone.  · Avoid caffeine, alcohol, and spicy foods. These can irritate your bladder.  · Urinate right after intercourse to flush out your bladder.  · If you use birth control pills and have frequent bladder infections, discuss it with your doctor.  Follow-up care  Call your healthcare provider if all symptoms are not gone after 3 days of treatment. This is especially important if you have repeat infections.  If a culture was done, you will be told if your treatment needs to be changed. If directed, you can call to find out the results.  If X-rays were done, you will be told if the results will affect your treatment.  Call 911  Call 911 if any of the following occur:  · Trouble breathing  · Hard to wake up or confusion  · Fainting or loss of consciousness  · Rapid heart rate  When to seek medical advice  Call your healthcare provider right away if any of these occur:  · Fever of 100.4ºF (38.0ºC) or higher, or as directed by your healthcare provider  · Symptoms are not better by the third day of treatment  · Back or belly (abdominal) pain that gets worse  · Repeated vomiting, or unable to keep medicine down  · Weakness or dizziness  · Vaginal discharge  · Pain, redness, or swelling in the outer vaginal area (labia)  Date Last Reviewed: 10/1/2016  © 0750-4800 [employer]. [address] [place] [address], [place] [address]. All rights reserved. This information is not intended as a substitute for professional medical care. Always follow your healthcare professional's instructions.        Pelvic Tilt, Leg Lift for Back Pain During Pregnancy  Before trying these exercises, talk to your healthcare provider to make sure they are safe for you. Ask your healthcare provider how  many times to do each exercise.      Pelvic tilt Leg lift   Pelvic tilt  This exercise stretches muscles in your buttocks and lower back. It also strengthens your stomach and helps set up good posture.  1. Get on your hands and knees with your back straight. A mat can help cushion your knees.  2. Try to pull your stomach muscles in. Tuck in your buttocks. This will tilt your pelvis up. As your pelvis tilts, your back will rise toward the ceiling.  3. Hold and count to 5, then relax.  Leg lifts  This strengthens the muscles of your back, buttocks, and stomach.  1. Get down on your hands and knees. Put your arms directly under your shoulders. Keep your knees shoulder-width apart.  2. Round your back. Then lift your left knee and gently bring it toward your elbow. Look at your knee as you raise it. (Stop moving your knee if you feel pressure in your stomach.)  3. Keeping your knee slightly bent, extend your leg. Lift your leg until you feel a stretch in your low back. Dont lift your leg higher than your hip.  4. Hold for 5 counts, then lower your left leg. Repeat the exercise with your right leg.  Date Last Reviewed: 8/16/2015  © 0592-3066 [employer] [address] [place] [address], [place] [address]. All rights reserved. This information is not intended as a substitute for professional medical care. Always follow your healthcare professional's instructions.

## 2020-10-18 ENCOUNTER — TELEPHONE (OUTPATIENT)
Dept: URGENT CARE | Facility: CLINIC | Age: 25
End: 2020-10-18

## 2020-10-18 DIAGNOSIS — B37.9 YEAST INFECTION: Primary | ICD-10-CM

## 2020-10-18 LAB
BACTERIA UR CULT: NORMAL
BACTERIA UR CULT: NORMAL

## 2020-10-18 RX ORDER — ASPIRIN 325 MG
1 TABLET, DELAYED RELEASE (ENTERIC COATED) ORAL NIGHTLY
Qty: 45 G | Refills: 1 | Status: SHIPPED | OUTPATIENT
Start: 2020-10-18 | End: 2020-10-18 | Stop reason: SDUPTHER

## 2020-10-18 RX ORDER — ASPIRIN 325 MG
1 TABLET, DELAYED RELEASE (ENTERIC COATED) ORAL NIGHTLY
Qty: 45 G | Refills: 1 | Status: SHIPPED | OUTPATIENT
Start: 2020-10-18 | End: 2020-10-25

## 2020-10-18 NOTE — TELEPHONE ENCOUNTER
Called pt about culture results. She states she is not having any dysuria but mainly having constant vaginal itching not associated with urination. She thinks she has a yeast infection. I'm sending in clotrimazole and instructed her to f/u with OBGYN or here if no improvement. Pt verbalized understanding and denied further questions.

## 2020-10-19 LAB
FINAL PATHOLOGIC DIAGNOSIS: NORMAL
Lab: NORMAL

## 2020-10-22 ENCOUNTER — PATIENT MESSAGE (OUTPATIENT)
Dept: OBSTETRICS AND GYNECOLOGY | Facility: CLINIC | Age: 25
End: 2020-10-22

## 2020-10-22 RX ORDER — FLUCONAZOLE 150 MG/1
150 TABLET ORAL ONCE
Qty: 1 TABLET | Refills: 1 | Status: SHIPPED | OUTPATIENT
Start: 2020-10-22 | End: 2020-10-22

## 2020-10-27 ENCOUNTER — LAB VISIT (OUTPATIENT)
Dept: LAB | Facility: OTHER | Age: 25
End: 2020-10-27
Attending: OBSTETRICS & GYNECOLOGY
Payer: COMMERCIAL

## 2020-10-27 ENCOUNTER — INITIAL PRENATAL (OUTPATIENT)
Dept: OBSTETRICS AND GYNECOLOGY | Facility: CLINIC | Age: 25
End: 2020-10-27
Payer: COMMERCIAL

## 2020-10-27 ENCOUNTER — CLINICAL SUPPORT (OUTPATIENT)
Dept: OBSTETRICS AND GYNECOLOGY | Facility: CLINIC | Age: 25
End: 2020-10-27
Payer: COMMERCIAL

## 2020-10-27 ENCOUNTER — PROCEDURE VISIT (OUTPATIENT)
Dept: MATERNAL FETAL MEDICINE | Facility: CLINIC | Age: 25
End: 2020-10-27
Payer: COMMERCIAL

## 2020-10-27 VITALS
SYSTOLIC BLOOD PRESSURE: 154 MMHG | BODY MASS INDEX: 46.73 KG/M2 | WEIGHT: 272.25 LBS | DIASTOLIC BLOOD PRESSURE: 66 MMHG

## 2020-10-27 DIAGNOSIS — Z23 FLU VACCINE NEED: Primary | ICD-10-CM

## 2020-10-27 DIAGNOSIS — Z36.9 ENCOUNTER FOR ANTENATAL SCREENING: ICD-10-CM

## 2020-10-27 DIAGNOSIS — Z36.89 ENCOUNTER FOR FETAL ANATOMIC SURVEY: ICD-10-CM

## 2020-10-27 DIAGNOSIS — Z32.01 POSITIVE PREGNANCY TEST: ICD-10-CM

## 2020-10-27 DIAGNOSIS — O30.042 DICHORIONIC DIAMNIOTIC TWIN PREGNANCY IN SECOND TRIMESTER: Primary | ICD-10-CM

## 2020-10-27 DIAGNOSIS — O21.9 NAUSEA/VOMITING IN PREGNANCY: ICD-10-CM

## 2020-10-27 DIAGNOSIS — O30.049 DICHORIONIC DIAMNIOTIC TWIN PREGNANCY, ANTEPARTUM: ICD-10-CM

## 2020-10-27 DIAGNOSIS — Z36.82 ENCOUNTER FOR ANTENATAL SCREENING FOR NUCHAL TRANSLUCENCY: ICD-10-CM

## 2020-10-27 DIAGNOSIS — Z36.9 ENCOUNTER FOR ANTENATAL SCREENING: Primary | ICD-10-CM

## 2020-10-27 DIAGNOSIS — E66.01 MORBID OBESITY WITH BMI OF 45.0-49.9, ADULT: ICD-10-CM

## 2020-10-27 PROCEDURE — 76814 OB US NUCHAL MEAS ADD-ON: CPT | Mod: S$GLB,,, | Performed by: OBSTETRICS & GYNECOLOGY

## 2020-10-27 PROCEDURE — 0500F PR INITIAL PRENATAL CARE VISIT: ICD-10-PCS | Mod: S$GLB,,, | Performed by: OBSTETRICS & GYNECOLOGY

## 2020-10-27 PROCEDURE — 99999 PR PBB SHADOW E&M-EST. PATIENT-LVL I: ICD-10-PCS | Mod: PBBFAC,,,

## 2020-10-27 PROCEDURE — 76813 PR US, OB NUCHAL, TRANSABDOM/TRANSVAG, FIRST GESTATION: ICD-10-PCS | Mod: S$GLB,,, | Performed by: OBSTETRICS & GYNECOLOGY

## 2020-10-27 PROCEDURE — 90686 IIV4 VACC NO PRSV 0.5 ML IM: CPT | Mod: S$GLB,,, | Performed by: NURSE PRACTITIONER

## 2020-10-27 PROCEDURE — 0500F INITIAL PRENATAL CARE VISIT: CPT | Mod: S$GLB,,, | Performed by: OBSTETRICS & GYNECOLOGY

## 2020-10-27 PROCEDURE — 90686 FLU VACCINE (QUAD) GREATER THAN OR EQUAL TO 3YO PRESERVATIVE FREE IM: ICD-10-PCS | Mod: S$GLB,,, | Performed by: NURSE PRACTITIONER

## 2020-10-27 PROCEDURE — 99999 PR PBB SHADOW E&M-EST. PATIENT-LVL I: CPT | Mod: PBBFAC,,,

## 2020-10-27 PROCEDURE — 76814 PR US, OB NUCHAL, TRANSABDOM/TRANSVAG, EA ADDL GESTATION: ICD-10-PCS | Mod: S$GLB,,, | Performed by: OBSTETRICS & GYNECOLOGY

## 2020-10-27 PROCEDURE — 99999 PR PBB SHADOW E&M-EST. PATIENT-LVL II: CPT | Mod: PBBFAC,,, | Performed by: OBSTETRICS & GYNECOLOGY

## 2020-10-27 PROCEDURE — 99999 PR PBB SHADOW E&M-EST. PATIENT-LVL II: ICD-10-PCS | Mod: PBBFAC,,, | Performed by: OBSTETRICS & GYNECOLOGY

## 2020-10-27 PROCEDURE — 90471 FLU VACCINE (QUAD) GREATER THAN OR EQUAL TO 3YO PRESERVATIVE FREE IM: ICD-10-PCS | Mod: S$GLB,,, | Performed by: NURSE PRACTITIONER

## 2020-10-27 PROCEDURE — 90471 IMMUNIZATION ADMIN: CPT | Mod: S$GLB,,, | Performed by: NURSE PRACTITIONER

## 2020-10-27 PROCEDURE — 84163 PAPPA SERUM: CPT

## 2020-10-27 PROCEDURE — 76813 OB US NUCHAL MEAS 1 GEST: CPT | Mod: S$GLB,,, | Performed by: OBSTETRICS & GYNECOLOGY

## 2020-10-27 RX ORDER — ONDANSETRON 4 MG/1
4 TABLET, FILM COATED ORAL EVERY 8 HOURS PRN
Qty: 30 TABLET | Refills: 1 | Status: SHIPPED | OUTPATIENT
Start: 2020-10-27 | End: 2021-10-27

## 2020-10-27 NOTE — PROGRESS NOTES
Pt is doing well today.  Reprots that she was having lower back pain and went to urgent care.  Was told that she had UTI.  Took abx and improved.  No longer having back pain.  Pt has CHTN.  Is taking Procardia 30 XL.  Not having any symptoms.  Will sign up for [employer] [facility] to monitor BP.  Will titrate if BP elevated.  Recommend started ASA daily.  Has some nausea/vomiting.  Will Rx Zofran.  Counseled extensively about di- di twins.  Discussed factors considered for mode of delivery.  Would like vaginal delivery if possible.  Pt with BMI of 46.  Will plan for early GTT.  Will schedule with next visit.  RTC in 4 weeks.

## 2020-10-28 ENCOUNTER — TELEPHONE (OUTPATIENT)
Dept: MATERNAL FETAL MEDICINE | Facility: CLINIC | Age: 25
End: 2020-10-28

## 2020-10-29 ENCOUNTER — PATIENT MESSAGE (OUTPATIENT)
Dept: ADMINISTRATIVE | Facility: OTHER | Age: 25
End: 2020-10-29

## 2020-11-02 LAB — INTEGRATED SCN PATIENT-IMP NAR: NORMAL

## 2020-11-09 NOTE — PROGRESS NOTES
Maternal Fetal Medicine consult    SUBJECTIVE:     Christina Stevens is a 25 y.o.  female with IUP at 15.0 weeks by 9 wk USd who is seen in consultation by [facility] for evaluation and management of:    1. Dichorionic- diamniotic twin intrauterine pregnancy  2. Chronic hypertension    She has no complaints today. Diagnosed with hypertension 2 years ago.   Obstetric History:  1. 1 SAB in 2019    Past Medical History:   Diagnosis Date    Hypertension    Depression/Anxiety    No past surgical history on file.  Family history: negative for birth defects, recurrent miscarriages, chromosomal abnormalities.   Social History     Tobacco Use    Smoking status: Never Smoker    Smokeless tobacco: Never Used   Substance Use Topics    Alcohol use: Not Currently     Comment: Occasionally.    Drug use: No     Review of patient's allergies indicates:   Allergen Reactions    No known allergies      Medications:  Nifedipine 30 mg PO daily    Aneuploidy screeninst trimester serum integrated screen completed.     Records reviewed in [facility]    Care team members:  Connie Reynaga MD - Primary OB       OBJECTIVE:     Blood Pressure:144/90 mmHg  Weight: 124 kg    Physical Exam:  General: WDWN,NAD    Ultrasound  performed. See viewpoint for full ultrasound report.          ASSESSMENT/PLAN:     25 y.o.  female with IUP at 15.0 weeks presents for M consultation.    1. Dichorionic-diamniotic twin intrauterine pregnancy  Today I discussed with the patient the finding of dichorionic-diamniotic twin pregnancy and the risks associated with this type of pregnancy.  I counseled her on the increased incidence of preeclampsia, gestational diabetes, fetal growth restriction and  labor that can occur in twin pregnancies.  We discussed plans for monthly ultrasound surveillance looking for signs of fetal growth restriction.    I also reviewed recommendations for delivery at 37-38 weeks gestation because of the increasing risk for poor  pregnancy outcomes in dichorionic-diamniotic twins that progress past this gestational age.    Recommendations from our MFM group at [facility]:  -Fetal ultrasound assessment monthly for growth until delivery beginning at around 24 weeks  -Targeted ultrasound evaluation of anatomy at 19 weeks  -Plan for delivery at 37-38 weeks gestation  -We typically recommend beginning  surveillance in all twins at 32 weeks due to the increased risk of adverse outcomes.  -Begin daily low dose aspirin after 12 weeks for pre-eclampsia prevention    After today's evaluation I recommend a repeat ultrasound assessment in 4-5 weeks for targeted anatomic evaluation. This has been scheduled.    2. Chronic Hypertension    Ms. Stevens was counseled on maternal/fetal risks associated with CHTN during pregnancy. These include but are not limited to worsening hypertension, superimposed preeclampsia (which may necessitate  delivery), placental abruption,  delivery and low but increased likelihood of stroke, renal failure, and cardiac failure. Fetal risks include miscarriage, stillbirth, growth restriction, prematurity and  death. Risks are increased with evidence of renal dysfunction (serum creatinine >1.1 mg/dL or baseline proteinuria).    Plan and Recommendations:  1. We have scheduled patient for a follow up ultrasound in 4 weeks for a fetal anatomy survey.  2. Serial ultrasounds for fetal growth starting at 24-28 weeks gestation.  3. Baseline labs: CMP, CBC, Protein/creatinine ratio or 24 hour urine for protein and creatinine clearance are recommended. If the patient has a urine p/c ratio of ?0.3, then a 24 hour collection should be performed. If the patients creatinine is ? 1.1, this should be repeated each trimester.   4. Glucose screen at this time secondary to obesity and hypertension.  5. Close monitoring of patient's blood pressures:  -- If BP < 160 mmHg systolic or 110 mmHg diastolic and no evidence of  end-organ damage, no antihypertensive therapy suggested.  -- If patient on antihypertensive medication, it is suggested that BP levels be maintained between 120 mmHg systolic and 80mmHg diastolic and 160 mmHg systolic and 110 mmHg diastolic  6. Fetal surveillance recommended (ex.weekly BPP or weekly NST/MVP if well controlled)  at 32 weeks of gestation or earlier if indicated.  7. Timing of delivery: If CHTN with no additional maternal or fetal complications, delivery is recommended  at 38-39 weeks. However, earlier delivery may be warranted depending on the clinical scenario. In this patient, with di-di TIUP, delivery recommended at 37-38 weeks as above  8. Begin baby aspirin 81mg PO daily for preeclampsia prevention at 12-14 weeks gestation.  9. If hypertension has been present for greater than 4 years, we recommend obtaining a baseline EKG and a maternal echocardiogram.  10. We recommend opthalmic exam if not recently performed on patients with uncontrolled hypertension or for hypertension diagnosis for 10 years or greater.  11. Postpartum, the patient should be placed on her prepregnancy regimen unless other therapy is thought to be optimal. For those newly diagnosed during pregnancy without other comorbidities, antihypertensive therapy should be implemented for pressures ?150 systolic or ?100 diastolic.     Time spent in consultation today: 20 minutes, >50% of which was face-to-face time with the patient.    Renato Martinez MD  Maternal Fetal Medicine fellow  PGY-5      MFM Attending:  I have seen, evaluated, and counseled the patient and I agree with the note above.   Марина Yanez MD

## 2020-11-10 ENCOUNTER — INITIAL CONSULT (OUTPATIENT)
Dept: MATERNAL FETAL MEDICINE | Facility: CLINIC | Age: 25
End: 2020-11-10
Payer: MEDICAID

## 2020-11-10 ENCOUNTER — PROCEDURE VISIT (OUTPATIENT)
Dept: MATERNAL FETAL MEDICINE | Facility: CLINIC | Age: 25
End: 2020-11-10
Payer: COMMERCIAL

## 2020-11-10 VITALS
WEIGHT: 273.38 LBS | BODY MASS INDEX: 46.92 KG/M2 | DIASTOLIC BLOOD PRESSURE: 90 MMHG | SYSTOLIC BLOOD PRESSURE: 144 MMHG

## 2020-11-10 DIAGNOSIS — O30.042 DICHORIONIC DIAMNIOTIC TWIN PREGNANCY IN SECOND TRIMESTER: ICD-10-CM

## 2020-11-10 DIAGNOSIS — O30.042 DICHORIONIC DIAMNIOTIC TWIN PREGNANCY IN SECOND TRIMESTER: Primary | ICD-10-CM

## 2020-11-10 PROCEDURE — 99999 PR PBB SHADOW E&M-EST. PATIENT-LVL III: CPT | Mod: PBBFAC,,, | Performed by: OBSTETRICS & GYNECOLOGY

## 2020-11-10 PROCEDURE — 76815 PR  US,PREGNANT UTERUS,LIMITED, 1/> FETUSES: ICD-10-PCS | Mod: 26,S$PBB,, | Performed by: OBSTETRICS & GYNECOLOGY

## 2020-11-10 PROCEDURE — 76815 OB US LIMITED FETUS(S): CPT | Mod: 59,PBBFAC | Performed by: OBSTETRICS & GYNECOLOGY

## 2020-11-10 PROCEDURE — 99213 OFFICE O/P EST LOW 20 MIN: CPT | Mod: S$PBB,TH,25, | Performed by: OBSTETRICS & GYNECOLOGY

## 2020-11-10 PROCEDURE — 99213 PR OFFICE/OUTPT VISIT, EST, LEVL III, 20-29 MIN: ICD-10-PCS | Mod: S$PBB,TH,25, | Performed by: OBSTETRICS & GYNECOLOGY

## 2020-11-10 PROCEDURE — 99999 PR PBB SHADOW E&M-EST. PATIENT-LVL III: ICD-10-PCS | Mod: PBBFAC,,, | Performed by: OBSTETRICS & GYNECOLOGY

## 2020-11-10 PROCEDURE — 76815 OB US LIMITED FETUS(S): CPT | Mod: 26,S$PBB,, | Performed by: OBSTETRICS & GYNECOLOGY

## 2020-11-10 PROCEDURE — 99213 OFFICE O/P EST LOW 20 MIN: CPT | Mod: PBBFAC,TH,25 | Performed by: OBSTETRICS & GYNECOLOGY

## 2020-11-10 NOTE — LETTER
November 10, 2020      Connie Reynaga MD  [address]  [address]  [place] [address]           [facility] 4th Fl  [address]  [facility] [address]  Phone: [phone]          Patient: Christina Stevens   MR Number: [id number]   YOB: 1995   Date of Visit: 11/10/2020       Dear Dr. Connie Reynaga:    Thank you for referring Christina Stevens to me for evaluation. Attached you will find relevant portions of my assessment and plan of care.    If you have questions, please do not hesitate to call me. I look forward to following Christina Stevens along with you.    Sincerely,    Марина Yanez MD    Enclosure  CC:  No Recipients    If you would like to receive this communication electronically, please contact [email or web address] or [phone] to request more information on [employer] Link access.    For providers and/or their staff who would like to refer a patient to [facility], please contact us through our one-stop-shop provider referral line, [facility], at [phone].    If you feel you have received this communication in error or would no longer like to receive these types of communications, please e-mail [email or web address]

## 2020-11-12 ENCOUNTER — PATIENT MESSAGE (OUTPATIENT)
Dept: OBSTETRICS AND GYNECOLOGY | Facility: CLINIC | Age: 25
End: 2020-11-12

## 2020-11-16 ENCOUNTER — ROUTINE PRENATAL (OUTPATIENT)
Dept: OBSTETRICS AND GYNECOLOGY | Facility: CLINIC | Age: 25
End: 2020-11-16
Payer: COMMERCIAL

## 2020-11-16 ENCOUNTER — INITIAL CONSULT (OUTPATIENT)
Dept: MATERNAL FETAL MEDICINE | Facility: CLINIC | Age: 25
End: 2020-11-16
Payer: COMMERCIAL

## 2020-11-16 ENCOUNTER — PATIENT MESSAGE (OUTPATIENT)
Dept: OBSTETRICS AND GYNECOLOGY | Facility: CLINIC | Age: 25
End: 2020-11-16

## 2020-11-16 ENCOUNTER — PROCEDURE VISIT (OUTPATIENT)
Dept: MATERNAL FETAL MEDICINE | Facility: CLINIC | Age: 25
End: 2020-11-16
Payer: COMMERCIAL

## 2020-11-16 ENCOUNTER — HOSPITAL ENCOUNTER (INPATIENT)
Facility: OTHER | Age: 25
LOS: 1 days | Discharge: HOME | DRG: 770 | End: 2020-11-17
Attending: OBSTETRICS & GYNECOLOGY | Admitting: OBSTETRICS & GYNECOLOGY
Payer: COMMERCIAL

## 2020-11-16 VITALS — DIASTOLIC BLOOD PRESSURE: 80 MMHG | WEIGHT: 274 LBS | SYSTOLIC BLOOD PRESSURE: 136 MMHG | BODY MASS INDEX: 47.03 KG/M2

## 2020-11-16 VITALS
DIASTOLIC BLOOD PRESSURE: 88 MMHG | BODY MASS INDEX: 47.19 KG/M2 | WEIGHT: 274.94 LBS | SYSTOLIC BLOOD PRESSURE: 144 MMHG

## 2020-11-16 DIAGNOSIS — O30.042 DICHORIONIC DIAMNIOTIC TWIN PREGNANCY IN SECOND TRIMESTER: Primary | ICD-10-CM

## 2020-11-16 DIAGNOSIS — O02.1 IUFD AT LESS THAN 20 WEEKS OF GESTATION: Primary | ICD-10-CM

## 2020-11-16 DIAGNOSIS — O20.0 THREATENED ABORTION IN SECOND TRIMESTER: ICD-10-CM

## 2020-11-16 DIAGNOSIS — O16.1 HYPERTENSION AFFECTING PREGNANCY IN FIRST TRIMESTER: ICD-10-CM

## 2020-11-16 DIAGNOSIS — O30.042 DICHORIONIC DIAMNIOTIC TWIN PREGNANCY IN SECOND TRIMESTER: ICD-10-CM

## 2020-11-16 LAB
ABO + RH BLD: NORMAL
BASOPHILS # BLD AUTO: 0.01 K/UL (ref 0–0.2)
BASOPHILS NFR BLD: 0.1 % (ref 0–1.9)
BILIRUB UR QL STRIP: NEGATIVE
BLD GP AB SCN CELLS X3 SERPL QL: NORMAL
CLARITY UR: CLEAR
COLOR UR: YELLOW
DIFFERENTIAL METHOD: ABNORMAL
EOSINOPHIL # BLD AUTO: 0.1 K/UL (ref 0–0.5)
EOSINOPHIL NFR BLD: 0.5 % (ref 0–8)
ERYTHROCYTE [DISTWIDTH] IN BLOOD BY AUTOMATED COUNT: 14.1 % (ref 11.5–14.5)
GLUCOSE UR QL STRIP: NEGATIVE
HCT VFR BLD AUTO: 34.5 % (ref 37–48.5)
HGB BLD-MCNC: 11.5 G/DL (ref 12–16)
HGB UR QL STRIP: NEGATIVE
IMM GRANULOCYTES # BLD AUTO: 0.04 K/UL (ref 0–0.04)
IMM GRANULOCYTES NFR BLD AUTO: 0.4 % (ref 0–0.5)
KETONES UR QL STRIP: ABNORMAL
LEUKOCYTE ESTERASE UR QL STRIP: NEGATIVE
LYMPHOCYTES # BLD AUTO: 2.3 K/UL (ref 1–4.8)
LYMPHOCYTES NFR BLD: 23.3 % (ref 18–48)
MCH RBC QN AUTO: 28 PG (ref 27–31)
MCHC RBC AUTO-ENTMCNC: 33.3 G/DL (ref 32–36)
MCV RBC AUTO: 84 FL (ref 82–98)
MONOCYTES # BLD AUTO: 1 K/UL (ref 0.3–1)
MONOCYTES NFR BLD: 10.1 % (ref 4–15)
NEUTROPHILS # BLD AUTO: 6.4 K/UL (ref 1.8–7.7)
NEUTROPHILS NFR BLD: 65.6 % (ref 38–73)
NITRITE UR QL STRIP: NEGATIVE
NRBC BLD-RTO: 0 /100 WBC
PH UR STRIP: 7 [PH] (ref 5–8)
PLATELET # BLD AUTO: 140 K/UL (ref 150–350)
PLATELET BLD QL SMEAR: ABNORMAL
PMV BLD AUTO: 10.9 FL (ref 9.2–12.9)
PROT UR QL STRIP: NEGATIVE
RBC # BLD AUTO: 4.11 M/UL (ref 4–5.4)
SARS-COV-2 RDRP RESP QL NAA+PROBE: NEGATIVE
SP GR UR STRIP: 1.01 (ref 1–1.03)
URN SPEC COLLECT METH UR: ABNORMAL
UROBILINOGEN UR STRIP-ACNC: NEGATIVE EU/DL
WBC # BLD AUTO: 9.76 K/UL (ref 3.9–12.7)

## 2020-11-16 PROCEDURE — 99999 PR PBB SHADOW E&M-EST. PATIENT-LVL II: ICD-10-PCS | Mod: PBBFAC,,, | Performed by: OBSTETRICS & GYNECOLOGY

## 2020-11-16 PROCEDURE — 99212 OFFICE O/P EST SF 10 MIN: CPT | Mod: 25,S$GLB,, | Performed by: OBSTETRICS & GYNECOLOGY

## 2020-11-16 PROCEDURE — 99999 PR PBB SHADOW E&M-EST. PATIENT-LVL II: CPT | Mod: PBBFAC,,, | Performed by: OBSTETRICS & GYNECOLOGY

## 2020-11-16 PROCEDURE — 88300 PR  SURG PATH,GROSS,LEVEL I: ICD-10-PCS | Mod: 26,,, | Performed by: PATHOLOGY

## 2020-11-16 PROCEDURE — 0502F SUBSEQUENT PRENATAL CARE: CPT | Mod: S$GLB,,, | Performed by: OBSTETRICS & GYNECOLOGY

## 2020-11-16 PROCEDURE — 72100002 HC LABOR CARE, 1ST 8 HOURS

## 2020-11-16 PROCEDURE — 72200004 HC VAGINAL DELIVERY LEVEL I

## 2020-11-16 PROCEDURE — 76815 OB US LIMITED FETUS(S): CPT | Mod: S$GLB,,, | Performed by: OBSTETRICS & GYNECOLOGY

## 2020-11-16 PROCEDURE — 25000003 PHARM REV CODE 250: Performed by: STUDENT IN AN ORGANIZED HEALTH CARE EDUCATION/TRAINING PROGRAM

## 2020-11-16 PROCEDURE — 0502F PR SUBSEQUENT PRENATAL CARE: ICD-10-PCS | Mod: S$GLB,,, | Performed by: OBSTETRICS & GYNECOLOGY

## 2020-11-16 PROCEDURE — 88300 SURGICAL PATH GROSS: CPT | Performed by: PATHOLOGY

## 2020-11-16 PROCEDURE — 76815 PR  US,PREGNANT UTERUS,LIMITED, 1/> FETUSES: ICD-10-PCS | Mod: S$GLB,,, | Performed by: OBSTETRICS & GYNECOLOGY

## 2020-11-16 PROCEDURE — 85025 COMPLETE CBC W/AUTO DIFF WBC: CPT

## 2020-11-16 PROCEDURE — 88300 SURGICAL PATH GROSS: CPT | Mod: 26,,, | Performed by: PATHOLOGY

## 2020-11-16 PROCEDURE — 99499 NO LOS: ICD-10-PCS | Mod: ,,, | Performed by: OBSTETRICS & GYNECOLOGY

## 2020-11-16 PROCEDURE — 63600175 PHARM REV CODE 636 W HCPCS: Performed by: STUDENT IN AN ORGANIZED HEALTH CARE EDUCATION/TRAINING PROGRAM

## 2020-11-16 PROCEDURE — S0030 INJECTION, METRONIDAZOLE: HCPCS | Performed by: STUDENT IN AN ORGANIZED HEALTH CARE EDUCATION/TRAINING PROGRAM

## 2020-11-16 PROCEDURE — 86850 RBC ANTIBODY SCREEN: CPT

## 2020-11-16 PROCEDURE — U0002 COVID-19 LAB TEST NON-CDC: HCPCS

## 2020-11-16 PROCEDURE — 11000001 HC ACUTE MED/SURG PRIVATE ROOM

## 2020-11-16 PROCEDURE — 99499 UNLISTED E&M SERVICE: CPT | Mod: ,,, | Performed by: OBSTETRICS & GYNECOLOGY

## 2020-11-16 PROCEDURE — 99212 PR OFFICE/OUTPT VISIT, EST, LEVL II, 10-19 MIN: ICD-10-PCS | Mod: 25,S$GLB,, | Performed by: OBSTETRICS & GYNECOLOGY

## 2020-11-16 PROCEDURE — 81003 URINALYSIS AUTO W/O SCOPE: CPT

## 2020-11-16 RX ORDER — OXYTOCIN/RINGER'S LACTATE 30/500 ML
95 PLASTIC BAG, INJECTION (ML) INTRAVENOUS ONCE
Status: DISCONTINUED | OUTPATIENT
Start: 2020-11-16 | End: 2020-11-17 | Stop reason: HOSPADM

## 2020-11-16 RX ORDER — ACETAMINOPHEN 500 MG
1000 TABLET ORAL ONCE
Status: COMPLETED | OUTPATIENT
Start: 2020-11-16 | End: 2020-11-16

## 2020-11-16 RX ORDER — NIFEDIPINE 30 MG/1
30 TABLET, EXTENDED RELEASE ORAL DAILY
Status: DISCONTINUED | OUTPATIENT
Start: 2020-11-17 | End: 2020-11-17 | Stop reason: HOSPADM

## 2020-11-16 RX ORDER — SIMETHICONE 80 MG
1 TABLET,CHEWABLE ORAL 4 TIMES DAILY PRN
Status: DISCONTINUED | OUTPATIENT
Start: 2020-11-16 | End: 2020-11-17 | Stop reason: HOSPADM

## 2020-11-16 RX ORDER — METRONIDAZOLE 500 MG/100ML
500 INJECTION, SOLUTION INTRAVENOUS
Status: COMPLETED | OUTPATIENT
Start: 2020-11-16 | End: 2020-11-17

## 2020-11-16 RX ORDER — ONDANSETRON 8 MG/1
8 TABLET, ORALLY DISINTEGRATING ORAL EVERY 8 HOURS PRN
Status: DISCONTINUED | OUTPATIENT
Start: 2020-11-16 | End: 2020-11-17 | Stop reason: HOSPADM

## 2020-11-16 RX ORDER — SODIUM CHLORIDE, SODIUM LACTATE, POTASSIUM CHLORIDE, CALCIUM CHLORIDE 600; 310; 30; 20 MG/100ML; MG/100ML; MG/100ML; MG/100ML
INJECTION, SOLUTION INTRAVENOUS CONTINUOUS
Status: DISCONTINUED | OUTPATIENT
Start: 2020-11-16 | End: 2020-11-17

## 2020-11-16 RX ORDER — ACETAMINOPHEN 325 MG/1
650 TABLET ORAL ONCE
Status: COMPLETED | OUTPATIENT
Start: 2020-11-16 | End: 2020-11-16

## 2020-11-16 RX ORDER — CALCIUM CARBONATE 200(500)MG
500 TABLET,CHEWABLE ORAL 3 TIMES DAILY PRN
Status: DISCONTINUED | OUTPATIENT
Start: 2020-11-16 | End: 2020-11-17 | Stop reason: HOSPADM

## 2020-11-16 RX ORDER — OXYTOCIN/RINGER'S LACTATE 30/500 ML
334 PLASTIC BAG, INJECTION (ML) INTRAVENOUS ONCE
Status: DISCONTINUED | OUTPATIENT
Start: 2020-11-16 | End: 2020-11-17 | Stop reason: HOSPADM

## 2020-11-16 RX ADMIN — ACETAMINOPHEN 1000 MG: 500 TABLET, FILM COATED ORAL at 04:11

## 2020-11-16 RX ADMIN — ACETAMINOPHEN 650 MG: 325 TABLET ORAL at 11:11

## 2020-11-16 RX ADMIN — SODIUM CHLORIDE, SODIUM LACTATE, POTASSIUM CHLORIDE, AND CALCIUM CHLORIDE: .6; .31; .03; .02 INJECTION, SOLUTION INTRAVENOUS at 04:11

## 2020-11-16 RX ADMIN — SODIUM CHLORIDE, SODIUM LACTATE, POTASSIUM CHLORIDE, AND CALCIUM CHLORIDE: .6; .31; .03; .02 INJECTION, SOLUTION INTRAVENOUS at 11:11

## 2020-11-16 RX ADMIN — AMPICILLIN SODIUM 2 G: 2 INJECTION, POWDER, FOR SOLUTION INTRAMUSCULAR; INTRAVENOUS at 11:11

## 2020-11-16 RX ADMIN — AMPICILLIN SODIUM 2 G: 2 INJECTION, POWDER, FOR SOLUTION INTRAMUSCULAR; INTRAVENOUS at 04:11

## 2020-11-16 RX ADMIN — METRONIDAZOLE 500 MG: 500 INJECTION, SOLUTION INTRAVENOUS at 05:11

## 2020-11-16 NOTE — PROGRESS NOTES
Maternal Fetal Medicine Follow up    SUBJECTIVE:     Ms. Stevens is a 25 year old  with a logan TIUP at 15.6 weeks today that presented to her primary OB after complaint of vaginal spotting, loss of fluid and passage of tissue.   On speculum exam, her cervix appeared to be closed however there was concern for protruding fetal membranes.    Upon ultrasound today, she was noted to have PPROM and demise of Twin A.    Obstetric History:  1 SAB in 2019    Past Medical History:   Diagnosis Date    Hypertension      History reviewed. No pertinent surgical history.  Family history: negative for birth defects, recurrent miscarriages, chromosomal abnormalities.   Social History     Tobacco Use    Smoking status: Never Smoker    Smokeless tobacco: Never Used   Substance Use Topics    Alcohol use: Not Currently     Comment: Occasionally.    Drug use: No     Review of patient's allergies indicates:   Allergen Reactions    No known allergies      Medications:  reviewed    Aneuploidy screening:   Will need 2nd trimester integrated screening    Records reviewed    Care team members:  Connie Reynaga MD - Primary OB   OBJECTIVE:     Blood Pressure: 136/80 mmHg  Weight: 124 kg    Physical Exam:  General: WDWN,NAD    Ultrasound performed. See viewpoint for full ultrasound report.          ASSESSMENT/PLAN:     25 year old  with a logan TIUP at 15.6 weeks today that presents to [facility] for ultrasound and evaluation.    1. PPROM and Demise of Twin A    Ms. Stevens was found to have a fetal demise of Twin A with anhydramnios. It also appears that Twin A is breech and within the cervical canal.  We discussed these unfortunate findings with her and her mother and reviewed potential management plans.    We reviewed the risks of  delivery, infection and placental abruption given her PPROM.     We also reviewed the threshold for fetal viability.     At this gestational age, we do not recommend any intervention however she does  have the option of expectant management of Twin B. If her pregnancy does progress to fetal viability, we would then revisit antibiotic administration.  She is aware that if she were to develop signs and symptoms of an infection or have increased bleeding with concerns of a placental abruption, we would recommend delivery.    She was also given the option of pregnancy termination however declines at this time.    Currently, we recommend inpatient management to the MFM team.       Time spent in consultation today: 30 minutes, >50% of which was face-to-face time with the patient.    Renato Martinez MD  Maternal Fetal Medicine fellow  PGY-5  [facility]

## 2020-11-16 NOTE — LETTER
November 16, 2020      Connie Reynaga MD  [address]  [address]  [place] [address]           [facility] 4th Fl  [address]  [place] [facility] [address]  Phone: [phone]          Patient: Christina Stevens   MR Number: [id number]   YOB: 1995   Date of Visit: 11/16/2020       Dear Dr. Connie Reynaga:    Thank you for referring Christina Stevens to me for evaluation. Attached you will find relevant portions of my assessment and plan of care.    If you have questions, please do not hesitate to call me. I look forward to following Christina Stevens along with you.    Sincerely,    Giovany Mosqueda III, MD    Enclosure  CC:  No Recipients    If you would like to receive this communication electronically, please contact [email or web address] or [phone] to request more information on [employer] Link access.    For providers and/or their staff who would like to refer a patient to [facility], please contact us through our one-stop-shop provider referral line, [facility], at [phone].    If you feel you have received this communication in error or would no longer like to receive these types of communications, please e-mail [email or web address]

## 2020-11-16 NOTE — PROGRESS NOTES
"Pt here today after calling with complaints of vaginal spotting and passage of "tissue" at home.  Pt reports that bleeding has been minimal but is concerned about the way the tissue looks.  In media tab, picture of "tissue" sent by patient.  Looks concerning for umbilical cord tissue.  On handheld US in office, Baby B with amniotic fluid and moving well. FHT of twin B looks appropriate. However, no fluid is seen around Baby A.  Difficult to see fetal heart activity on Twin A.  A speculum exam was performed and cervix visually closed to fingertip.  However, there is small amount of what appears to membranes, protruding through the cervical os.  Discussed findings with the patient.  Pt reports thinking that she may have started leaking fluid last week.  Thought that she urinated on herself but reports that it happened a few more times.  Has not had any leakage today.  Discussed possibility of PPROM of Twin A.  Talked about what this means.  Discussed that management may be different depending on status of Twin B.  Will send to [facility] for consult and US now.    "

## 2020-11-16 NOTE — TELEPHONE ENCOUNTER
Spoke to patient. States she had lower back pain and intermittent on yesterday which has resolved today. This morning she saw light pink/dark brown spotting and mucus this morning when wiping. Dr. Reynaga spoke with the patient regarding concerns of vaginal spotting. Patient is scheduled to come in today to be evaluated.

## 2020-11-16 NOTE — LETTER
November 17, 2020         [address]  [address]  [facility] [address]  Phone: [phone]  Fax: [phone]       Patient: Christina Stevens   YOB: 1995  Date of Visit: 11/17/2020    To Whom It May Concern:    Shwetha Stevens  was at [facility] from 11/16- 11/17/2020. She may return to work on 11/23 with no restrictions. If you have any questions or concerns, or if I can be of further assistance, please do not hesitate to contact me.    Sincerely,    Dee Denis MD

## 2020-11-17 ENCOUNTER — ANESTHESIA (OUTPATIENT)
Dept: OBSTETRICS AND GYNECOLOGY | Facility: OTHER | Age: 25
End: 2020-11-17

## 2020-11-17 ENCOUNTER — ANESTHESIA EVENT (OUTPATIENT)
Dept: OBSTETRICS AND GYNECOLOGY | Facility: OTHER | Age: 25
End: 2020-11-17

## 2020-11-17 VITALS
HEART RATE: 76 BPM | SYSTOLIC BLOOD PRESSURE: 127 MMHG | OXYGEN SATURATION: 99 % | RESPIRATION RATE: 18 BRPM | DIASTOLIC BLOOD PRESSURE: 63 MMHG | TEMPERATURE: 99 F

## 2020-11-17 PROCEDURE — 99024 PR POST-OP FOLLOW-UP VISIT: ICD-10-PCS | Mod: ,,, | Performed by: OBSTETRICS & GYNECOLOGY

## 2020-11-17 PROCEDURE — 25000003 PHARM REV CODE 250: Performed by: STUDENT IN AN ORGANIZED HEALTH CARE EDUCATION/TRAINING PROGRAM

## 2020-11-17 PROCEDURE — 63600175 PHARM REV CODE 636 W HCPCS: Performed by: STUDENT IN AN ORGANIZED HEALTH CARE EDUCATION/TRAINING PROGRAM

## 2020-11-17 PROCEDURE — S0030 INJECTION, METRONIDAZOLE: HCPCS | Performed by: STUDENT IN AN ORGANIZED HEALTH CARE EDUCATION/TRAINING PROGRAM

## 2020-11-17 PROCEDURE — 99024 POSTOP FOLLOW-UP VISIT: CPT | Mod: ,,, | Performed by: OBSTETRICS & GYNECOLOGY

## 2020-11-17 RX ORDER — ACETAMINOPHEN 325 MG/1
650 TABLET ORAL ONCE
Status: DISCONTINUED | OUTPATIENT
Start: 2020-11-17 | End: 2020-11-17 | Stop reason: HOSPADM

## 2020-11-17 RX ORDER — DIPHENHYDRAMINE HCL 25 MG
25 CAPSULE ORAL EVERY 6 HOURS PRN
Status: DISCONTINUED | OUTPATIENT
Start: 2020-11-17 | End: 2020-11-17 | Stop reason: HOSPADM

## 2020-11-17 RX ADMIN — METRONIDAZOLE 500 MG: 500 INJECTION, SOLUTION INTRAVENOUS at 01:11

## 2020-11-17 RX ADMIN — DIPHENHYDRAMINE HYDROCHLORIDE 25 MG: 25 CAPSULE ORAL at 02:11

## 2020-11-17 RX ADMIN — NIFEDIPINE 30 MG: 30 TABLET, FILM COATED, EXTENDED RELEASE ORAL at 09:11

## 2020-11-17 RX ADMIN — AMPICILLIN SODIUM 2 G: 2 INJECTION, POWDER, FOR SOLUTION INTRAMUSCULAR; INTRAVENOUS at 11:11

## 2020-11-17 RX ADMIN — METRONIDAZOLE 500 MG: 500 INJECTION, SOLUTION INTRAVENOUS at 10:11

## 2020-11-17 RX ADMIN — AMPICILLIN SODIUM 2 G: 2 INJECTION, POWDER, FOR SOLUTION INTRAMUSCULAR; INTRAVENOUS at 05:11

## 2020-11-17 RX ADMIN — DIPHENHYDRAMINE HYDROCHLORIDE 25 MG: 25 CAPSULE ORAL at 08:11

## 2020-11-17 NOTE — SUBJECTIVE & OBJECTIVE
OB History    Para Term  AB Living   3 0 0 0 2 0   SAB TAB Ectopic Multiple Live Births   1 0 0 0 0      # Outcome Date GA Lbr Adria/2nd Weight Sex Delivery Anes PTL Lv   3 Current            2 AB 20 15w6d   M    FD   1 SAB 19 6w0d    SAB   FD     Past Medical History:   Diagnosis Date    Hypertension      History reviewed. No pertinent surgical history.    PTA Medications   Medication Sig    loratadine (CLARITIN) 10 mg tablet Take 1 tablet (10 mg total) by mouth daily as needed for Allergies.    NIFEdipine (ADALAT CC) 30 MG TbSR Take 1 tablet (30 mg total) by mouth once daily.    ondansetron (ZOFRAN) 4 MG tablet Take 1 tablet (4 mg total) by mouth every 8 (eight) hours as needed for Nausea.       Review of patient's allergies indicates:   Allergen Reactions    No known allergies         Family History     Problem Relation (Age of Onset)    Depression Mother    Diabetes Father    Hypertension Father        Tobacco Use    Smoking status: Never Smoker    Smokeless tobacco: Never Used   Substance and Sexual Activity    Alcohol use: Not Currently     Comment: Occasionally.    Drug use: No    Sexual activity: Yes     Partners: Male     Birth control/protection: None     Review of Systems   Constitutional: Negative for chills, fatigue and fever.   Eyes: Negative for visual disturbance.   Respiratory: Negative for shortness of breath.    Cardiovascular: Negative for chest pain.   Gastrointestinal: Positive for abdominal pain. Negative for nausea and vomiting.   Genitourinary: Positive for vaginal bleeding (Spotting) and vaginal discharge (Possible leakage of clear fluid, denies any malodorous or discolored discharge). Negative for dysuria.   Musculoskeletal: Positive for back pain.   Neurological: Positive for headaches.      Objective:     Vital Signs (Most Recent):  Pulse: 94 (20 1739)  BP: (!) 154/65 (20 1734)  SpO2: 100 % (20 1739) Vital Signs (24h Range):  Pulse:   [] 94  SpO2:  [100 %] 100 %  BP: (133-154)/(58-88) 154/65        There is no height or weight on file to calculate BMI.    Physical Exam:   Constitutional: She is oriented to person, place, and time. She appears well-developed and well-nourished.   Morbidly obese female    HENT:   Head: Normocephalic and atraumatic.    Eyes: Conjunctivae and EOM are normal.    Neck: Normal range of motion.    Cardiovascular: Normal rate.     Pulmonary/Chest: Effort normal. No respiratory distress.        Abdominal: Soft. There is no abdominal tenderness. There is no rebound and no guarding.   No fundal TTP     Genitourinary:    Genitourinary Comments:   Normal external female genitalia, upon gentle SVE, fetus noted to be approximately half delivered through cervix, approximately 3 cm dilated. Slight vaginal odor appreciated but no evidence of purulent or abnormal vaginal discharge.             Musculoskeletal: Normal range of motion. No edema.       Neurological: She is alert and oriented to person, place, and time.    Skin: Skin is warm and dry.    Psychiatric: She has a normal mood and affect. Her behavior is normal.   Grieving appropriately       Cervix:  Dilation: 3 cm     Significant Labs:  Lab Results   Component Value Date    GROUPTRH B POS 11/16/2020    HEPBSAG Negative 10/27/2020       CBC:   Recent Labs   Lab 11/16/20  1607   WBC 9.76   RBC 4.11   HGB 11.5*   HCT 34.5*   *   MCV 84   MCH 28.0   MCHC 33.3     I have personallly reviewed all pertinent lab results from the last 24 hours.

## 2020-11-17 NOTE — HPI
Christina Stevens is a 25 y.o.  at 15w6d who presents as a direct admit from [facility] clinic for PPROM and demise of Twin A in a di-di twin pregnancy. The patient initially presented to her primary OB this monitoring with complaints of vaginal spotting, loss of fluid and passage of tissue. The patient reports that on , she had an episode where she felt she may have urinated on herself. She has since noticed a few other similar episodes since that time. On the evening of 11/15, she began to experience low back pain and intermittent cramping. This morning, she noticed brown/red vaginal spotting and vaginal discharge with worsening lower abdominal cramping pain. She has not noticed any malodorous or discolored discharge. She denies any fevers or chills.    On speculum exam in clinic today, her cervix appeared to be closed; however, there was concern for protruding fetal membranes. Per primary OB, tissue that patient passed was concerning for umbilical cord tissue (see image in Media). The patient then presented to [facility] clinic where fetal demise of Twin A with anhydramnios was confirmed. Fetus was noted to be breech and within cervical canal. Patient was then moved to L&D for anticipated delivery of Twin A.

## 2020-11-17 NOTE — NURSING
of patient came to resolve through shaing room to see baby. He was tearful and request time alone. rn allowed and to [facility] to do assessment and pericare. Patient voided without diff.

## 2020-11-17 NOTE — PLAN OF CARE
provided a compassionate presence and grief care for patient as well as facilitated completion of decedent care paperwork.  Patient communicated to  that her  home selection is [facility].

## 2020-11-17 NOTE — PLAN OF CARE
" provided a compassionate presence, reflective listening, grief care and conversation and documents regarding decisions near end of life for patient.  These documents included "Caring for Families of Infant and Fetal Loss" booklet, "Notice of Parental Rights," "Authorization of Confidential Medical Information" and "Consent for Respectful Burial" forms.  This conversation resulted in patient being undecided and will contact  at a later time.    "

## 2020-11-17 NOTE — NURSING
"Patient reported increase feeling of needing to have a bm. rn called dr. Phelps. MD to room and did SVE noted 3 cm and no cervical change. pateint reported "hungry" dr. Phelps gave new order to allow patient to eat.  "

## 2020-11-17 NOTE — HOSPITAL COURSE
2020: Admitted to [facility] service for PPROM and demise of Twin A with anticipated delivery of Twin A.  of Twin A with avulsed umbilical cord, placenta remains in situ. Antibiotic regimen with Ampicillin and Flagyl initiated.  2020: NAEO. Patient was checked overnight 2/2 rectal pressure and cervix found to be 3 cm dilated. Reports relief of symptoms after BM. Denies any F/C or N/V. Antibiotics continued and patient remains afebrile. Repeat speculum exam performed and reassuring, visually 0.5-1 cm dilated with no abnormal discharge or vaginal bleeding present.

## 2020-11-17 NOTE — CARE UPDATE
MD to bedside as patient was complaining of rectal pressure  Examined and unchanged. Remains 3 cm.    Neda Phelps M.D.  [facility] PGY2

## 2020-11-17 NOTE — PROGRESS NOTES
Afternoon Assessment:    Resident to bedside for afternoon assessment. Patient feeling well and has been resting comfortably throughout the day. Denies any current abdominal pain or pressure. Denies any malodorous vaginal odor, LOF, or abnormal vaginal discharge. Denies any F/C or N/V. Has had 2 BM today.    Temp:  [97 °F (36.1 °C)-98.8 °F (37.1 °C)] 98.8 °F (37.1 °C)  Pulse:  [76-96] 76  Resp:  [18] 18  SpO2:  [99 %-100 %] 99 %  BP: (107-136)/(49-66) 127/63    PE:  General: Resting comfortably in bed  CV: RR  Pulm: Non-labored respirations  Abdomen: Soft, fundus non-TTP  SSE: Normal external female genitalia, normal appearing vaginal and cervical mucosa; cervix appears visually 0.5 cm to 1 cm dilated; scant amount of light pink mucous present in external os, no vaginal bleeding or abnormal vaginal discharge/odor present    FHT of Twin B confirmed in appropriate position in the 150s by doppler    Labs:  No results for input(s): WBC, RBC, HGB, HCT, PLT, MCV, MCH, MCHC in the last 24 hours.    A/P:  -- Repeat SSE performed and reassuring with approximately 0.5-1 cm dilation, as above, SVE deferred given infection risk  -- Now s/p 24 hours of Ampicillin/Flagyl  -- Has been afebrile throughout hospitalization  -- FHT of Twin B confirmed in the 150s  -- Will plan to discharge home with expectant management and strict ED return precautions (RT JENNIFER for any severe abdominal cramping, abnormal vaginal discharge, or heavy vaginal bleeding)  -- Plan discussed with MFM staff who is in agreement with plan; Dr. Denton discussed all precautions this AM with patient  -- RTC with Dr. Reynaga on 11/24, will also need close MFM follow up      Dee Denis M.D.  OB/GYN PGY-2

## 2020-11-17 NOTE — PLAN OF CARE
Plan of care reviewed with patient. Pt remains free from falls and injuries. Pt denies pain at this time. Resting in bed. Vital signs stable. No distress noted, will continue to monitor.

## 2020-11-17 NOTE — L&D DELIVERY NOTE
[facility]  Vaginal Delivery   Operative Note    SUMMARY     Upon patient's presentation to L&D, gentle SVE revealed footling breech fetus approximately half delivered through cervix. Cervix found to be approximately 3 cm dilated. With gentle pressure applied to fetal shoulders and head, fetus able to be delivered easily through cervix. Fetus noted to be stillborn upon delivery; fetus was wrapped in blanket and handed off to nursing staff per patient request.    Umbilical cord appeared to have avulsed completely prior to delivery; as such, no clamp was required. Sterile speculum was then placed in vagina for further inspection. A small piece of umbilical cord was noted at the level of the external cervical os. Sterile ring forceps were utilized to attempt to grasp the umbilical cord for placement of the Endoloop; however, this tissue was noted to be friable. As such, Endoloop was unable to be placed around cord but not cord was protruding through the cervix. Vagina and cervix were then copiously irrigated with Betadine. No lacerations were noted.     Patient and mother at bedside were noted to be grieving appropriately. Patient tolerated delivery well overall. Instruments and sponges counted correctly at conclusion.    Indications: IUFD at less than 20 weeks of gestation     Pregnancy complicated by:   Patient Active Problem List   Diagnosis    Depression    Allergic rhinitis    Dichorionic diamniotic twin pregnancy in second trimester    Morbid obesity with BMI of 45.0-49.9, adult    Hypertension affecting pregnancy in first trimester    Threatened  in second trimester    IUFD at less than 20 weeks of gestation     Admitting GA: 15w6d    Delivery Information for Leighton Stevens    Birth information:  YOB: 2022   Time of birth: 4:29 PM   Sex: male   Head Delivery Date/Time: 2020  4:29 PM   Delivery type: Vaginal, Spontaneous   Gestational Age:  <None>    Delivery Providers    Delivering clinician: Duran Denton MD   Provider Role    LIEN Condon RN Victoria A. Polo, MD Wyeth A Lawson, MD             Measurements    Weight: 65 g  Length:          Apgars    Living status: Living  Apgars:  1 min.:  5 min.:  10 min.:  15 min.:  20 min.:    Skin color:  0  0  0  0  0    Heart rate:  0  0  0  0  0    Reflex irritability:  0  0  0  0  0    Muscle tone:  0  0  0  0  0    Respiratory effort:  0  0  0  0  0    Total:  0  0  0  0  0    Apgars assigned by: PACO BAUTISTA         Operative Delivery    Forceps attempted?: No  Vacuum extractor attempted?: No         Shoulder Dystocia    Shoulder dystocia present?: No           Presentation    Presentation: Footling Breech  Position: Occiput Anterior           Interventions/Resuscitation    Method: None       Cord    Vessels: Unknown  Complications: None  Delayed Cord Clamping?: No  Gases Sent?: No  Stem Cell Collection (by MD): No       Placenta    Placenta delivery date/time:   Placenta removal:            Labor Events:       labor: Yes     Labor Onset Date/Time:         Dilation Complete Date/Time:         Start Pushing Date/Time:         Start Pushing Date/Time:       Rupture Date/Time:            Rupture type:          Fluid Amount:       Fluid Color:       Fluid Odor:       Membrane Status:                steroids: None     Antibiotics given for GBS: No     Induction:       Indications for induction:        Augmentation:       Indications for augmentation:       Labor complications: None     Additional complications:          Cervical ripening:                     Delivery:      Episiotomy: None     Indication for Episiotomy:       Perineal Lacerations: None Repaired:      Periurethral Laceration:   Repaired:     Labial Laceration:   Repaired:     Sulcus Laceration:   Repaired:     Vaginal Laceration:   Repaired:     Cervical Laceration:   Repaired:     Repair suture:  None     Repair # of packets:       Last Value - EBL - Nursing (mL): 50     Sum - EBL - Nursing (mL): 50     Last Value - EBL - Anesthesia (mL):      Calculated QBL (mL):       Vaginal Sweep Performed: Yes     Surgicount Correct: Yes       Other providers:       Anesthesia    Method: None          Details (if applicable):  Trial of Labor      Categorization:      Priority:     Indications for :     Incision Type:       Additional  information:  Forceps:    Vacuum:    Breech:    Observed anomalies    Other (Comments):         Dee Denis M.D.  OB/GYN PGY-2      I was personally present, supervised and participated in all aspects of this delivery.    Duran Denton Jr., MD  Maternal Fetal Medicine

## 2020-11-17 NOTE — ASSESSMENT & PLAN NOTE
-- Previable PPROM and IUFD of Twin A  -- Twin B FHT verified in M clinic today, fluid noted to be WNL  -- See remainder of plan under IUFD

## 2020-11-17 NOTE — ANESTHESIA PREPROCEDURE EVALUATION
[facility]  Anesthesia Pre-Operative Evaluation         Patient Name: Christina Stevens  YOB: 1995  MRN: [id number]    2020      Christina Stevens is a 25 y.o. female  @ 16w0d with h/o morbid obesity and cHTN on nifedipine who presents as a direct admit from [facility] clinic for PPROM and demise of Twin A in a di-di twin pregnancy. Demised twin A delivered 20, expectant management of Twin B at this time. Denies bleeding/clotting disorders or spine surgery.      OB History    Para Term  AB Living   3 0 0 0 2 0   SAB TAB Ectopic Multiple Live Births   1              # Outcome Date GA Lbr Adria/2nd Weight Sex Delivery Anes PTL Lv   3 Current            2 AB 20 15w6d   M    FD   1 SAB 19 6w0d    SAB   FD       Review of patient's allergies indicates:   Allergen Reactions    No known allergies        Wt Readings from Last 1 Encounters:   20 1502 124.3 kg (274 lb)       BP Readings from Last 3 Encounters:   20 133/62   20 136/80   20 (!) 144/88       Patient Active Problem List   Diagnosis    Depression    Allergic rhinitis    Dichorionic diamniotic twin pregnancy in second trimester    Morbid obesity with BMI of 45.0-49.9, adult    Hypertension affecting pregnancy in first trimester    Threatened  in second trimester    IUFD at less than 20 weeks of gestation       History reviewed. No pertinent surgical history.    Social History     Socioeconomic History    Marital status: Single     Spouse name: Not on file    Number of children: Not on file    Years of education: Not on file    Highest education level: Not on file   Occupational History    Occupation: security at [employer]   Social Needs    Financial resource strain: Not on file    Food insecurity     Worry: Not on file     Inability: Not on file    Transportation needs     Medical: Not on file     Non-medical: Not on file   Tobacco Use    Smoking status:  Never Smoker    Smokeless tobacco: Never Used   Substance and Sexual Activity    Alcohol use: Not Currently     Comment: Occasionally.    Drug use: No    Sexual activity: Yes     Partners: Male     Birth control/protection: None   Lifestyle    Physical activity     Days per week: Not on file     Minutes per session: Not on file    Stress: Not on file   Relationships    Social connections     Talks on phone: Not on file     Gets together: Not on file     Attends Jew service: Not on file     Active member of club or organization: Not on file     Attends meetings of clubs or organizations: Not on file     Relationship status: Not on file   Other Topics Concern    Not on file   Social History Narrative    Not on file         Chemistry        Component Value Date/Time     10/27/2020 1530    K 4.1 10/27/2020 1530     10/27/2020 1530    CO2 24 10/27/2020 1530    BUN 9 10/27/2020 1530    CREATININE 0.6 10/27/2020 1530    GLU 99 10/27/2020 1530        Component Value Date/Time    CALCIUM 9.3 10/27/2020 1530    ALKPHOS 52 09/12/2019 2028    AST 21 09/12/2019 2028    ALT 16 09/12/2019 2028    BILITOT 0.4 09/12/2019 2028    ESTGFRAFRICA >60 10/27/2020 1530    EGFRNONAA >60 10/27/2020 1530            Lab Results   Component Value Date    WBC 9.76 11/16/2020    HGB 11.5 (L) 11/16/2020    HCT 34.5 (L) 11/16/2020    MCV 84 11/16/2020     (L) 11/16/2020       No results for input(s): PT, INR, PROTIME, APTT in the last 72 hours.          Pre-op Assessment    I have reviewed the Patient Summary Reports.    I have reviewed the Nursing Notes. I have reviewed the NPO Status.   I have reviewed the Medications.     Review of Systems  Hematology/Oncology:  Hematology Normal        Cardiovascular:   Hypertension    Pulmonary:  Pulmonary Normal    Renal/:  Renal/ Normal     Hepatic/GI:  Hepatic/GI Normal    Neurological:  Neurology Normal    Endocrine:  Endocrine Normal        Physical  Exam  General:  Well nourished    Airway/Jaw/Neck:  Airway Findings: Mouth Opening: Small, but > 3cm Tongue: Normal  General Airway Assessment: Adult  Mallampati: IV  Jaw/Neck Findings:  Neck ROM: Normal ROM      Dental:  Dental Findings: In tact   Chest/Lungs:  Chest/Lungs Clear    Heart/Vascular:  Heart Findings: Normal       Mental Status:  Mental Status Findings:  Cooperative, Alert and Oriented         Anesthesia Plan  Type of Anesthesia, risks & benefits discussed:  Anesthesia Type:  CSE, epidural, general, spinal  Patient's Preference:   Intra-op Monitoring Plan:   Intra-op Monitoring Plan Comments:   Post Op Pain Control Plan:   Post Op Pain Control Plan Comments:   Induction:    Beta Blocker:  Patient is not currently on a Beta-Blocker (No further documentation required).       Informed Consent: Patient understands risks and agrees with Anesthesia plan.  Questions answered. Anesthesia consent signed with patient.  ASA Score: 3     Day of Surgery Review of History & Physical: I have interviewed and examined the patient. I have reviewed the patient's H&P dated:    H&P update referred to the surgeon.

## 2020-11-17 NOTE — ASSESSMENT & PLAN NOTE
IUFD and previable PPROM of Twin A diagnosed in [facility] clinic today  -- Patient counseled that there is no current available intervention for Twin B and that Twin A will deliver inevitably  -- Counseled in [facility] clinic on option for expectant management of Twin B and if pregnancy does progress to fetal viability, would then revisit latency antibiotic administration  -- Patient aware if signs of infection or heavy bleeding were to occur, delivery would be recommended  -- Declines pregnancy termination at this time  -- Draw CBC, T&S given bleeding risk  -- Will attempt endoloop of umbilical cord of Twin A after delivery and expectantly manage  --  and CONCETTA sanchez

## 2020-11-17 NOTE — DISCHARGE INSTRUCTIONS
Call clinic [phone] or L & D after hours at [phone] for vaginal bleeding, leakage of fluids, regular contractions every 5 mins for 2 hours, decreased fetal movements ( 10 kicks in 2 hours), headache not relieved by Tylenol, blurry vision, or temp of 100.4 or greater.  Begin doing fetal kick counts, at least 10 movements in 2 hours starting at 28 weeks gestation.  Keep next clinic appointment

## 2020-11-17 NOTE — ASSESSMENT & PLAN NOTE
-- BP: (133-154)/(58-88) 154/65  -- Continue home Procardia 30 XL  -- HA on admission, will give Tylenol

## 2020-11-17 NOTE — H&P
[facility]  Obstetrics  History & Physical    Patient Name: Christina Stevens  MRN: [id number]  Admission Date: 2020  Primary Care Provider: Leny Heredia MD    Subjective:     Principal Problem:IUFD at less than 20 weeks of gestation    History of Present Illness:  Christina Stevens is a 25 y.o.  at 15w6d who presents as a direct admit from [facility] clinic for PPROM and demise of Twin A in a di-di twin pregnancy. The patient initially presented to her primary OB this monitoring with complaints of vaginal spotting, loss of fluid and passage of tissue. The patient reports that on , she had an episode where she felt she may have urinated on herself. She has since noticed a few other similar episodes since that time. On the evening of 11/15, she began to experience low back pain and intermittent cramping. This morning, she noticed brown/red vaginal spotting and vaginal discharge with worsening lower abdominal cramping pain. She has not noticed any malodorous or discolored discharge. She denies any fevers or chills.    On speculum exam in clinic today, her cervix appeared to be closed; however, there was concern for protruding fetal membranes. Per primary OB, tissue that patient passed was concerning for umbilical cord tissue (see image in Media). The patient then presented to [facility] clinic where fetal demise of Twin A with anhydramnios was confirmed. Fetus was noted to be breech and within cervical canal. Patient was then moved to L&D for anticipated delivery of Twin A.      OB History    Para Term  AB Living   3 0 0 0 2 0   SAB TAB Ectopic Multiple Live Births   1 0 0 0 0      # Outcome Date GA Lbr Adria/2nd Weight Sex Delivery Anes PTL Lv   3 Current            2 AB 20 15w6d   M    FD   1 SAB 19 6w0d    SAB   FD     Past Medical History:   Diagnosis Date    Hypertension      History reviewed. No pertinent surgical history.    PTA Medications   Medication Sig    loratadine  (CLARITIN) 10 mg tablet Take 1 tablet (10 mg total) by mouth daily as needed for Allergies.    NIFEdipine (ADALAT CC) 30 MG TbSR Take 1 tablet (30 mg total) by mouth once daily.    ondansetron (ZOFRAN) 4 MG tablet Take 1 tablet (4 mg total) by mouth every 8 (eight) hours as needed for Nausea.       Review of patient's allergies indicates:   Allergen Reactions    No known allergies         Family History     Problem Relation (Age of Onset)    Depression Mother    Diabetes Father    Hypertension Father        Tobacco Use    Smoking status: Never Smoker    Smokeless tobacco: Never Used   Substance and Sexual Activity    Alcohol use: Not Currently     Comment: Occasionally.    Drug use: No    Sexual activity: Yes     Partners: Male     Birth control/protection: None     Review of Systems   Constitutional: Negative for chills, fatigue and fever.   Eyes: Negative for visual disturbance.   Respiratory: Negative for shortness of breath.    Cardiovascular: Negative for chest pain.   Gastrointestinal: Positive for abdominal pain. Negative for nausea and vomiting.   Genitourinary: Positive for vaginal bleeding (Spotting) and vaginal discharge (Possible leakage of clear fluid, denies any malodorous or discolored discharge). Negative for dysuria.   Musculoskeletal: Positive for back pain.   Neurological: Positive for headaches.      Objective:     Vital Signs (Most Recent):  Pulse: 94 (11/16/20 1739)  BP: (!) 154/65 (11/16/20 1734)  SpO2: 100 % (11/16/20 1739) Vital Signs (24h Range):  Pulse:  [] 94  SpO2:  [100 %] 100 %  BP: (133-154)/(58-88) 154/65        There is no height or weight on file to calculate BMI.    Physical Exam:   Constitutional: She is oriented to person, place, and time. She appears well-developed and well-nourished.   Morbidly obese female    HENT:   Head: Normocephalic and atraumatic.    Eyes: Conjunctivae and EOM are normal.    Neck: Normal range of motion.    Cardiovascular: Normal rate.      Pulmonary/Chest: Effort normal. No respiratory distress.        Abdominal: Soft. There is no abdominal tenderness. There is no rebound and no guarding.   No fundal TTP     Genitourinary:    Genitourinary Comments:   Normal external female genitalia, upon gentle SVE, fetus noted to be approximately half delivered through cervix, approximately 3 cm dilated. Slight vaginal odor appreciated but no evidence of purulent or abnormal vaginal discharge.             Musculoskeletal: Normal range of motion. No edema.       Neurological: She is alert and oriented to person, place, and time.    Skin: Skin is warm and dry.    Psychiatric: She has a normal mood and affect. Her behavior is normal.   Grieving appropriately       Cervix:  Dilation: 3 cm     Significant Labs:  Lab Results   Component Value Date    GROUPTRH B POS 2020    HEPBSAG Negative 10/27/2020       CBC:   Recent Labs   Lab 20  1607   WBC 9.76   RBC 4.11   HGB 11.5*   HCT 34.5*   *   MCV 84   MCH 28.0   MCHC 33.3     I have personallly reviewed all pertinent lab results from the last 24 hours.    Assessment/Plan:     25 y.o. female  at 15w6d for:    * IUFD at less than 20 weeks of gestation  IUFD and previable PPROM of Twin A diagnosed in [facility] clinic today  -- Patient counseled that there is no current available intervention for Twin B and that Twin A will deliver inevitably  -- Counseled in [facility] clinic on option for expectant management of Twin B and if pregnancy does progress to fetal viability, would then revisit latency antibiotic administration  -- Patient aware if signs of infection or heavy bleeding were to occur, delivery would be recommended  -- Declines pregnancy termination at this time  -- Draw CBC, T&S given bleeding risk  -- Will attempt endoloop of umbilical cord of Twin A after delivery and expectantly manage  --  and CONCETTA consulted    Hypertension affecting pregnancy in first trimester  -- BP: (133-154)/(58-88)  154/65  -- Continue home Procardia 30 XL  -- HA on admission, will give Tylenol    Morbid obesity with BMI of 45.0-49.9, adult  -- SCDs for DVT prophylaxis  -- Encourage ambulation    Dichorionic diamniotic twin pregnancy in second trimester  -- Previable PPROM and IUFD of Twin A  -- Twin B FHT verified in [facility] clinic today, fluid noted to be WNL  -- See remainder of plan under IUFD      Dee Denis MD   PGY-2  Obstetrics  [facility]

## 2020-11-18 NOTE — SUBJECTIVE & OBJECTIVE
Obstetric HPI:  Reports mild cramping and rectal pressure last night after delivery of Twin A, now resolved after BM. Denies any current abdominal pain or pressure. Denies any malodorous or abnormal vaginal discharge. Did notice some spotting last night after delivery of Twin A, no evidence of active bleeding.     Objective:     Vital Signs (Most Recent):  Temp: 98.8 °F (37.1 °C) (11/17/20 1133)  Pulse: 76 (11/17/20 1152)  Resp: 18 (11/17/20 1152)  BP: 127/63 (11/17/20 1152)  SpO2: 99 % (11/17/20 1132) Vital Signs (24h Range):  Temp:  [97 °F (36.1 °C)-98.8 °F (37.1 °C)] 98.8 °F (37.1 °C)  Pulse:  [76-96] 76  Resp:  [18] 18  SpO2:  [99 %-100 %] 99 %  BP: (107-136)/(49-66) 127/63        There is no height or weight on file to calculate BMI.        Intake/Output Summary (Last 24 hours) at 11/17/2020 1841  Last data filed at 11/17/2020 0259  Gross per 24 hour   Intake --   Output 450 ml   Net -450 ml       Cervical Exam: Deferred     Significant Labs:  Recent Lab Results     None          Physical Exam:   Constitutional: She is oriented to person, place, and time. She appears well-developed and well-nourished.   Morbidly obese female    HENT:   Head: Normocephalic and atraumatic.    Eyes: Conjunctivae and EOM are normal.    Neck: Normal range of motion.    Cardiovascular: Normal rate.     Pulmonary/Chest: Effort normal. No respiratory distress.        Abdominal: Soft. There is no abdominal tenderness. There is no rebound and no guarding.   No fundal TTP     Genitourinary:    Genitourinary Comments:   Deferred this AM             Musculoskeletal: Normal range of motion. No edema.       Neurological: She is alert and oriented to person, place, and time.    Skin: Skin is warm and dry.    Psychiatric: She has a normal mood and affect. Her behavior is normal.   Grieving appropriately

## 2020-11-18 NOTE — PLAN OF CARE
Attempted to complete sw consult but pt was sleeping. Will attempt again tomorrow.     Tara Posadas LCSW    [facility]  [email or web address]    (phone) [phone] or  [address] [phone]  (fax) [phone]

## 2020-11-18 NOTE — ASSESSMENT & PLAN NOTE
IUFD and previable PPROM of Twin A diagnosed in [facility] clinic today  -- PPD #1 s/p  of Twin A  -- Patient counseled that there is no evidence for intervention for Twin B, such as rescue cerclage  -- Counseled in [facility] clinic on option for expectant management of Twin B and if pregnancy does progress to fetal viability, would then revisit latency antibiotic administration  -- Patient aware if signs of infection or heavy bleeding were to occur, delivery would be recommended  -- Declines pregnancy termination at this time  --  and SW consulted and have seen patient and provided appropriate resources  -- No pain or concerning vaginal bleeding or discharge, no evidence of infection this AM  -- Will plan to repeat speculum exam this afternoon and if reassuring will plan to discharge home for expectant management with strict ED return precautions

## 2020-11-18 NOTE — ASSESSMENT & PLAN NOTE
-- BP: (107-136)/(49-66) 127/63  -- Continue home Procardia 30 XL  -- HA on admission, will give Tylenol

## 2020-11-18 NOTE — PROGRESS NOTES
[facility]  Obstetrics  Antepartum Progress Note    Patient Name: Christina Stevens  MRN: [id number]  Admission Date: 2020  Hospital Length of Stay: 1 days  Attending Physician: Duran Denton MD  Primary Care Provider: Leny Heredia MD    Subjective:     Principal Problem:IUFD at less than 20 weeks of gestation    HPI:  Christina Stevens is a 25 y.o.  at 15w6d who presents as a direct admit from [facility] clinic for PPROM and demise of Twin A in a di-di twin pregnancy. The patient initially presented to her primary OB this monitoring with complaints of vaginal spotting, loss of fluid and passage of tissue. The patient reports that on , she had an episode where she felt she may have urinated on herself. She has since noticed a few other similar episodes since that time. On the evening of 11/15, she began to experience low back pain and intermittent cramping. This morning, she noticed brown/red vaginal spotting and vaginal discharge with worsening lower abdominal cramping pain. She has not noticed any malodorous or discolored discharge. She denies any fevers or chills.    On speculum exam in clinic today, her cervix appeared to be closed; however, there was concern for protruding fetal membranes. Per primary OB, tissue that patient passed was concerning for umbilical cord tissue (see image in Media). The patient then presented to [facility] clinic where fetal demise of Twin A with anhydramnios was confirmed. Fetus was noted to be breech and within cervical canal. Patient was then moved to L&D for anticipated delivery of Twin A.    Hospital Course:  2020: Admitted to [facility] service for PPROM and demise of Twin A with anticipated delivery of Twin A.  of Twin A with avulsed umbilical cord, placenta remains in situ. Antibiotic regimen with Ampicillin and Flagyl initiated.  2020: NAEO. Patient was checked overnight 2/2 rectal pressure and cervix found to be 3 cm dilated. Reports relief of  symptoms after BM. Denies any F/C or N/V. Antibiotics continued and patient remains afebrile.    Obstetric HPI:  Reports mild cramping and rectal pressure last night after delivery of Twin A, now resolved after BM. Denies any current abdominal pain or pressure. Denies any malodorous or abnormal vaginal discharge. Did notice some spotting last night after delivery of Twin A, no evidence of active bleeding.     Objective:     Vital Signs (Most Recent):  Temp: 98.8 °F (37.1 °C) (11/17/20 1133)  Pulse: 76 (11/17/20 1152)  Resp: 18 (11/17/20 1152)  BP: 127/63 (11/17/20 1152)  SpO2: 99 % (11/17/20 1132) Vital Signs (24h Range):  Temp:  [97 °F (36.1 °C)-98.8 °F (37.1 °C)] 98.8 °F (37.1 °C)  Pulse:  [76-96] 76  Resp:  [18] 18  SpO2:  [99 %-100 %] 99 %  BP: (107-136)/(49-66) 127/63        There is no height or weight on file to calculate BMI.        Intake/Output Summary (Last 24 hours) at 11/17/2020 1841  Last data filed at 11/17/2020 0259  Gross per 24 hour   Intake --   Output 450 ml   Net -450 ml       Cervical Exam: Deferred     Significant Labs:  Recent Lab Results     None          Physical Exam:   Constitutional: She is oriented to person, place, and time. She appears well-developed and well-nourished.   Morbidly obese female    HENT:   Head: Normocephalic and atraumatic.    Eyes: Conjunctivae and EOM are normal.    Neck: Normal range of motion.    Cardiovascular: Normal rate.     Pulmonary/Chest: Effort normal. No respiratory distress.        Abdominal: Soft. There is no abdominal tenderness. There is no rebound and no guarding.   No fundal TTP     Genitourinary:    Genitourinary Comments:   Deferred this AM             Musculoskeletal: Normal range of motion. No edema.       Neurological: She is alert and oriented to person, place, and time.    Skin: Skin is warm and dry.    Psychiatric: She has a normal mood and affect. Her behavior is normal.   Grieving appropriately       Assessment/Plan:     25 y.o. female   at 15w6d for:    * IUFD at less than 20 weeks of gestation  IUFD and previable PPROM of Twin A diagnosed in [facility] clinic today  -- PPD #1 s/p  of Twin A  -- Patient counseled that there is no evidence for intervention for Twin B, such as rescue cerclage  -- Counseled in [facility] clinic on option for expectant management of Twin B and if pregnancy does progress to fetal viability, would then revisit latency antibiotic administration  -- Patient aware if signs of infection or heavy bleeding were to occur, delivery would be recommended  -- Declines pregnancy termination at this time  --  and SW consulted and have seen patient and provided appropriate resources  -- No pain or concerning vaginal bleeding or discharge, no evidence of infection this AM  -- Will plan to repeat speculum exam this afternoon and if reassuring will plan to discharge home for expectant management with strict ED return precautions    Hypertension affecting pregnancy in first trimester  -- BP: (107-136)/(49-66) 127/63  -- Continue home Procardia 30 XL  -- HA on admission, will give Tylenol    Morbid obesity with BMI of 45.0-49.9, adult  -- SCDs for DVT prophylaxis  -- Encourage ambulation    Dichorionic diamniotic twin pregnancy in second trimester  -- Previable PPROM and IUFD of Twin A  -- Twin B FHT verified in [facility] clinic yesterday, fluid noted to be WNL  -- See remainder of plan under IUFD        Dee Denis MD   PGY-2  Obstetrics  [facility]

## 2020-11-18 NOTE — ASSESSMENT & PLAN NOTE
-- Previable PPROM and IUFD of Twin A  -- Twin B FHT verified in [facility] clinic yesterday, fluid noted to be WNL  -- See remainder of plan under IUFD

## 2020-11-18 NOTE — DISCHARGE SUMMARY
[facility]  Obstetrics  Discharge Summary      Patient Name: Christina Stevens  MRN: [id number]  Admission Date: 2020  Hospital Length of Stay: 1 days  Discharge Date and Time: 2020  5:36 PM  Attending Physician: Duran Denton  Discharging Provider: Dee Denis MD   Primary Care Provider: Leny Heredia MD    HPI: Christina Stevens is a 25 y.o.  at 15w6d who presents as a direct admit from [facility] clinic for PPROM and demise of Twin A in a di-di twin pregnancy. The patient initially presented to her primary OB this monitoring with complaints of vaginal spotting, loss of fluid and passage of tissue. The patient reports that on , she had an episode where she felt she may have urinated on herself. She has since noticed a few other similar episodes since that time. On the evening of 11/15, she began to experience low back pain and intermittent cramping. This morning, she noticed brown/red vaginal spotting and vaginal discharge with worsening lower abdominal cramping pain. She has not noticed any malodorous or discolored discharge. She denies any fevers or chills.    On speculum exam in clinic today, her cervix appeared to be closed; however, there was concern for protruding fetal membranes. Per primary OB, tissue that patient passed was concerning for umbilical cord tissue (see image in Media). The patient then presented to [facility] clinic where fetal demise of Twin A with anhydramnios was confirmed. Fetus was noted to be breech and within cervical canal. Patient was then moved to L&D for anticipated delivery of Twin A.      Hospital Course:   2020: Admitted to [facility] service for PPROM and demise of Twin A with anticipated delivery of Twin A.  of Twin A with avulsed umbilical cord, placenta remains in situ. Antibiotic regimen with Ampicillin and Flagyl initiated.  2020: NAEO. Patient was checked overnight 2/2 rectal pressure and cervix found to be 3 cm dilated. Reports relief of  symptoms after BM. Denies any F/C or N/V. Antibiotics continued and patient remains afebrile. Repeat speculum exam performed and reassuring, visually 0.5-1 cm dilated with no abnormal discharge or vaginal bleeding present.      Prior to discharge patient was able to void, ambulate, tolerate PO and pain was well controlled. Patient was given strict ED return precautions for which patient voiced understanding. FHT of Twin B confirmed prior to discharge. Patient was subsequently discharged home.    Consults (From admission, onward)        Status Ordering Provider     Inpatient consult to Social Work  Once     Provider:  (Not yet assigned)    KRAIG Haji     Inpatient consult to Spiritual Care  Once     Provider:  (Not yet assigned)    Completed KRAIG PERDUE          Final Active Diagnoses:    Diagnosis Date Noted POA    PRINCIPAL PROBLEM:  IUFD at less than 20 weeks of gestation [O02.1] 11/16/2020 Yes    Hypertension affecting pregnancy in first trimester [O16.1] 11/16/2020 Yes    Dichorionic diamniotic twin pregnancy in second trimester [O30.042] 10/27/2020 Yes    Morbid obesity with BMI of 45.0-49.9, adult [E66.01, Z68.42] 10/27/2020 Not Applicable      Problems Resolved During this Admission:        Significant Diagnostic Studies: Labs:   CBC   Recent Labs   Lab 11/16/20  1607   WBC 9.76   HGB 11.5*   HCT 34.5*   *    and All labs within the past 24 hours have been reviewed    Immunizations     None          Delivery:    Episiotomy: None   Lacerations: None   Repair suture: None   Repair # of packets:     Blood loss (ml): 50     Birth information:  YOB: 2022   Time of birth: 4:29 PM   Sex: male   Delivery type: Vaginal, Spontaneous   Gestational Age: 15w6d    Delivery Clinician:      Other providers:       Additional  information:  Forceps:    Vacuum:    Breech:    Observed anomalies      Living?:           APGARS  One minute Five minutes Ten minutes   Skin color:          Heart rate:         Grimace:         Muscle tone:         Breathing:         Totals: 0  0  0      Placenta: Delivered:       appearance    Pending Diagnostic Studies:     Procedure Component Value Units Date/Time    Specimen to Pathology, Surgery Other (FETUS twin A 15 6/7weeks) [[id number]] Collected: 11/16/20 2100    Order Status: Sent Lab Status: In process Updated: 11/17/20 1132          Discharged Condition: good    Disposition: Home or Self Care    Follow Up:  Follow-up Information     Connie Reynaga MD. Go in 1 week.    Specialty: Obstetrics and Gynecology  Why: Hospital Follow Up  Contact information:  [address] [address]  [phone]                 Patient Instructions:      Diet Adult Regular     Notify your health care provider if you experience any of the following:  temperature >100.4     Notify your health care provider if you experience any of the following:  persistent nausea and vomiting or diarrhea     Notify your health care provider if you experience any of the following:  severe uncontrolled pain     Notify your health care provider if you experience any of the following:  redness, tenderness, or signs of infection (pain, swelling, redness, odor or green/yellow discharge around incision site)     Notify your health care provider if you experience any of the following:  difficulty breathing or increased cough     Notify your health care provider if you experience any of the following:  severe persistent headache     Notify your health care provider if you experience any of the following:  worsening rash     Notify your health care provider if you experience any of the following:  persistent dizziness, light-headedness, or visual disturbances     Notify your health care provider if you experience any of the following:  increased confusion or weakness     Notify your health care provider if you experience any of the following:   Order Comments: Malodorous vaginal  discharge  Vaginal bleeding soaking through more than 1 pad/hour for more than 1 hour  Severe lower abdominal pain or cramping     Activity as tolerated     Medications:  Discharge Medication List as of 11/17/2020  5:25 PM      CONTINUE these medications which have NOT CHANGED    Details   loratadine (CLARITIN) 10 mg tablet Take 1 tablet (10 mg total) by mouth daily as needed for Allergies., Starting Tue 9/29/2020, Normal      NIFEdipine (ADALAT CC) 30 MG TbSR Take 1 tablet (30 mg total) by mouth once daily., Starting Tue 9/29/2020, Normal      ondansetron (ZOFRAN) 4 MG tablet Take 1 tablet (4 mg total) by mouth every 8 (eight) hours as needed for Nausea., Starting Tue 10/27/2020, Until Wed 10/27/2021, Normal             Dee Denis MD   PGY-2  Obstetrics  [facility]

## 2020-11-20 LAB
FINAL PATHOLOGIC DIAGNOSIS: NORMAL
GROSS: NORMAL
Lab: NORMAL

## 2020-11-23 ENCOUNTER — ROUTINE PRENATAL (OUTPATIENT)
Dept: OBSTETRICS AND GYNECOLOGY | Facility: CLINIC | Age: 25
End: 2020-11-23
Payer: COMMERCIAL

## 2020-11-23 VITALS — DIASTOLIC BLOOD PRESSURE: 92 MMHG | SYSTOLIC BLOOD PRESSURE: 138 MMHG

## 2020-11-23 DIAGNOSIS — O02.1 IUFD AT LESS THAN 20 WEEKS OF GESTATION: Primary | ICD-10-CM

## 2020-11-23 DIAGNOSIS — O30.042 DICHORIONIC DIAMNIOTIC TWIN PREGNANCY IN SECOND TRIMESTER: ICD-10-CM

## 2020-11-23 PROCEDURE — 99999 PR PBB SHADOW E&M-EST. PATIENT-LVL II: ICD-10-PCS | Mod: PBBFAC,,, | Performed by: OBSTETRICS & GYNECOLOGY

## 2020-11-23 PROCEDURE — 99999 PR PBB SHADOW E&M-EST. PATIENT-LVL II: CPT | Mod: PBBFAC,,, | Performed by: OBSTETRICS & GYNECOLOGY

## 2020-11-23 PROCEDURE — 0502F PR SUBSEQUENT PRENATAL CARE: ICD-10-PCS | Mod: S$GLB,,, | Performed by: OBSTETRICS & GYNECOLOGY

## 2020-11-23 PROCEDURE — 0502F SUBSEQUENT PRENATAL CARE: CPT | Mod: S$GLB,,, | Performed by: OBSTETRICS & GYNECOLOGY

## 2020-11-23 NOTE — PROGRESS NOTES
Pt here for follow up after recent delivery of Twin A @ 15w6d.  Pt is having some discharge, sometimes pink and sometimes brownish.  Not having any bright read bleeding.  Denies any cramping or passage of tissue.  Overall patient is doing ok.  Feeling like she is managing, all things considered.  Otherwise no issues.  Used handheld US today to look at Baby B.  Baby B with good movement and fluid.  No issues seen + FHT.  Will have RTC in 4 weeks.  Early glucose screen at that time.  Will call [facility] and have next US scheduled for next week.  Scheduled 12/2 @ 920.  Will do GTT then

## 2020-11-24 ENCOUNTER — TELEPHONE (OUTPATIENT)
Dept: MATERNAL FETAL MEDICINE | Facility: CLINIC | Age: 25
End: 2020-11-24

## 2020-11-24 ENCOUNTER — TELEPHONE (OUTPATIENT)
Dept: OBSTETRICS AND GYNECOLOGY | Facility: CLINIC | Age: 25
End: 2020-11-24

## 2020-11-24 NOTE — TELEPHONE ENCOUNTER
"----- Message from Vesna Benavidez sent at 11/24/2020 10:56 AM CST -----  Regarding: Returning Call  Name of Who is Calling:   Christina Stevens      What is the request in detail:    Patient called stating, "she's returning the office's call and would like you to please call again."    Thanks!      Reply by [facility]: no    Call Back:  [phone]                                        "

## 2020-11-24 NOTE — TELEPHONE ENCOUNTER
After talking with  I called patient and told her we could move her appointment to week of Dec 07-11.  Patient given precautions on pain,bleeding and fever.  Patient verbalized her understanding.   also recommends the patient be seen sooner than Dec 22 by .  Patient informed that I would message  to reschedule her.  Patient verbalized her understanding.      ----- Message from Marlen Veloz sent at 11/24/2020  9:36 AM CST -----  Pt is not happy about her appt on Wednesday. She said she has to work and wants to push it back a week. Please call

## 2020-11-24 NOTE — TELEPHONE ENCOUNTER
----- Message from Melany Colvin sent at 11/24/2020  9:24 AM CST -----  Contact: CECILIA PARISI [[id number]]  Type: Patient Call Back Who called:CECILIA PARISI [[id number]] What is the request in detail:Patient would like a call back in regards to rescheduling her labs and ultrasound on the same day for 12/8. I attempted to reschedule and although I was able to receive a availability for the lab I wasn't able for the ultrasound.  Can the clinic reply by [facility]?no Would the patient rather a call back or a response via [facility]? Call back Best call back number:[phone] (mobile)   Additional Information:

## 2020-11-24 NOTE — TELEPHONE ENCOUNTER
Spoke to patient. Called regarding MFM recommendation to be seen a week earlier that currently scheduled appointment on 12/22/2020 with Dr. Reynaga. Patient states she can only come in on the 14th and works the rest of the week all day for 6:00 am to 6:00 pm. Avised Dr. Reynaga will be in surgery that day. Offered appointment with another provider on 12/14/2020. Patient declined and expressed she is more comfortable seeing Dr. Reynaga than another provider. Patient states she would like to wait to see Dr. Reynaga on 12/22/2020.

## 2020-11-24 NOTE — TELEPHONE ENCOUNTER
Left message to patient to call the office at [phone] to move appointment to the week of the December 14-18 per [facility] recommendation.

## 2020-11-24 NOTE — TELEPHONE ENCOUNTER
Spoke to patient. Patient advised that she will need to contact [facility] to reschedule her ultrasound and provided contact information. Advised that the lab can be rescheduled to that same day.

## 2020-12-04 ENCOUNTER — PATIENT MESSAGE (OUTPATIENT)
Dept: OBSTETRICS AND GYNECOLOGY | Facility: CLINIC | Age: 25
End: 2020-12-04

## 2020-12-04 RX ORDER — FLUTICASONE PROPIONATE 50 MCG
1 SPRAY, SUSPENSION (ML) NASAL DAILY
Qty: 16 G | Refills: 2 | Status: SHIPPED | OUTPATIENT
Start: 2020-12-04 | End: 2021-04-05 | Stop reason: SDUPTHER

## 2020-12-07 ENCOUNTER — TELEPHONE (OUTPATIENT)
Dept: OBSTETRICS AND GYNECOLOGY | Facility: CLINIC | Age: 25
End: 2020-12-07

## 2020-12-07 ENCOUNTER — ANESTHESIA (OUTPATIENT)
Dept: OBSTETRICS AND GYNECOLOGY | Facility: OTHER | Age: 25
End: 2020-12-07
Payer: COMMERCIAL

## 2020-12-07 ENCOUNTER — PATIENT MESSAGE (OUTPATIENT)
Dept: ADMINISTRATIVE | Facility: OTHER | Age: 25
End: 2020-12-07

## 2020-12-07 ENCOUNTER — ANESTHESIA EVENT (OUTPATIENT)
Dept: OBSTETRICS AND GYNECOLOGY | Facility: OTHER | Age: 25
End: 2020-12-07
Payer: COMMERCIAL

## 2020-12-07 ENCOUNTER — HOSPITAL ENCOUNTER (OUTPATIENT)
Facility: OTHER | Age: 25
LOS: 1 days | Discharge: HOME | End: 2020-12-08
Attending: OBSTETRICS & GYNECOLOGY | Admitting: OBSTETRICS & GYNECOLOGY
Payer: COMMERCIAL

## 2020-12-07 ENCOUNTER — TELEPHONE (OUTPATIENT)
Dept: MATERNAL FETAL MEDICINE | Facility: CLINIC | Age: 25
End: 2020-12-07

## 2020-12-07 DIAGNOSIS — O46.90 VAGINAL BLEEDING IN PREGNANCY: Primary | ICD-10-CM

## 2020-12-07 DIAGNOSIS — O42.919 PRETERM PREMATURE RUPTURE OF MEMBRANES (PPROM) WITH UNKNOWN ONSET OF LABOR: ICD-10-CM

## 2020-12-07 DIAGNOSIS — Z98.890 S/P DILATION AND CURETTAGE: ICD-10-CM

## 2020-12-07 LAB
ABO + RH BLD: NORMAL
ALBUMIN SERPL BCP-MCNC: 2.9 G/DL (ref 3.5–5.2)
ALP SERPL-CCNC: 50 U/L (ref 55–135)
ALT SERPL W/O P-5'-P-CCNC: 17 U/L (ref 10–44)
ANION GAP SERPL CALC-SCNC: 6 MMOL/L (ref 8–16)
APTT BLDCRRT: 31 SEC (ref 21–32)
AST SERPL-CCNC: 17 U/L (ref 10–40)
BASOPHILS # BLD AUTO: 0.01 K/UL (ref 0–0.2)
BASOPHILS NFR BLD: 0.1 % (ref 0–1.9)
BILIRUB SERPL-MCNC: 0.2 MG/DL (ref 0.1–1)
BLD GP AB SCN CELLS X3 SERPL QL: NORMAL
BUN SERPL-MCNC: 6 MG/DL (ref 6–20)
CALCIUM SERPL-MCNC: 8.7 MG/DL (ref 8.7–10.5)
CHLORIDE SERPL-SCNC: 108 MMOL/L (ref 95–110)
CO2 SERPL-SCNC: 22 MMOL/L (ref 23–29)
CREAT SERPL-MCNC: 0.5 MG/DL (ref 0.5–1.4)
DIFFERENTIAL METHOD: ABNORMAL
EOSINOPHIL # BLD AUTO: 0.1 K/UL (ref 0–0.5)
EOSINOPHIL NFR BLD: 0.9 % (ref 0–8)
ERYTHROCYTE [DISTWIDTH] IN BLOOD BY AUTOMATED COUNT: 13.3 % (ref 11.5–14.5)
EST. GFR  (AFRICAN AMERICAN): >60 ML/MIN/1.73 M^2
EST. GFR  (NON AFRICAN AMERICAN): >60 ML/MIN/1.73 M^2
FIBRINOGEN PPP-MCNC: 482 MG/DL (ref 182–366)
GLUCOSE SERPL-MCNC: 95 MG/DL (ref 70–110)
HCT VFR BLD AUTO: 32.6 % (ref 37–48.5)
HGB BLD-MCNC: 10.8 G/DL (ref 12–16)
IMM GRANULOCYTES # BLD AUTO: 0.04 K/UL (ref 0–0.04)
IMM GRANULOCYTES NFR BLD AUTO: 0.5 % (ref 0–0.5)
INR PPP: 0.9 (ref 0.8–1.2)
LYMPHOCYTES # BLD AUTO: 1.9 K/UL (ref 1–4.8)
LYMPHOCYTES NFR BLD: 24.7 % (ref 18–48)
MCH RBC QN AUTO: 28.1 PG (ref 27–31)
MCHC RBC AUTO-ENTMCNC: 33.1 G/DL (ref 32–36)
MCV RBC AUTO: 85 FL (ref 82–98)
MONOCYTES # BLD AUTO: 0.9 K/UL (ref 0.3–1)
MONOCYTES NFR BLD: 11.3 % (ref 4–15)
NEUTROPHILS # BLD AUTO: 4.7 K/UL (ref 1.8–7.7)
NEUTROPHILS NFR BLD: 62.5 % (ref 38–73)
NRBC BLD-RTO: 0 /100 WBC
PLATELET # BLD AUTO: 131 K/UL (ref 150–350)
PMV BLD AUTO: 9.6 FL (ref 9.2–12.9)
POTASSIUM SERPL-SCNC: 3.7 MMOL/L (ref 3.5–5.1)
PROT SERPL-MCNC: 6.6 G/DL (ref 6–8.4)
PROTHROMBIN TIME: 9.8 SEC (ref 9–12.5)
RBC # BLD AUTO: 3.85 M/UL (ref 4–5.4)
SARS-COV-2 RDRP RESP QL NAA+PROBE: NEGATIVE
SODIUM SERPL-SCNC: 136 MMOL/L (ref 136–145)
WBC # BLD AUTO: 7.49 K/UL (ref 3.9–12.7)

## 2020-12-07 PROCEDURE — 88305 TISSUE EXAM BY PATHOLOGIST: CPT | Performed by: PATHOLOGY

## 2020-12-07 PROCEDURE — 96376 TX/PRO/DX INJ SAME DRUG ADON: CPT

## 2020-12-07 PROCEDURE — 85610 PROTHROMBIN TIME: CPT

## 2020-12-07 PROCEDURE — 63600175 PHARM REV CODE 636 W HCPCS: Performed by: STUDENT IN AN ORGANIZED HEALTH CARE EDUCATION/TRAINING PROGRAM

## 2020-12-07 PROCEDURE — 59841 INDUCED ABORTION DILAT&EVAC: CPT | Mod: ,,, | Performed by: OBSTETRICS & GYNECOLOGY

## 2020-12-07 PROCEDURE — 25000003 PHARM REV CODE 250: Performed by: STUDENT IN AN ORGANIZED HEALTH CARE EDUCATION/TRAINING PROGRAM

## 2020-12-07 PROCEDURE — 37000009 HC ANESTHESIA EA ADD 15 MINS: Performed by: OBSTETRICS & GYNECOLOGY

## 2020-12-07 PROCEDURE — G0378 HOSPITAL OBSERVATION PER HR: HCPCS

## 2020-12-07 PROCEDURE — 86920 COMPATIBILITY TEST SPIN: CPT

## 2020-12-07 PROCEDURE — 88305 TISSUE EXAM BY PATHOLOGIST: ICD-10-PCS | Mod: 26,,, | Performed by: PATHOLOGY

## 2020-12-07 PROCEDURE — 37000008 HC ANESTHESIA 1ST 15 MINUTES: Performed by: OBSTETRICS & GYNECOLOGY

## 2020-12-07 PROCEDURE — 96375 TX/PRO/DX INJ NEW DRUG ADDON: CPT

## 2020-12-07 PROCEDURE — 62326 NJX INTERLAMINAR LMBR/SAC: CPT | Performed by: STUDENT IN AN ORGANIZED HEALTH CARE EDUCATION/TRAINING PROGRAM

## 2020-12-07 PROCEDURE — 25000003 PHARM REV CODE 250: Performed by: OBSTETRICS & GYNECOLOGY

## 2020-12-07 PROCEDURE — 71000039 HC RECOVERY, EACH ADD'L HOUR: Performed by: OBSTETRICS & GYNECOLOGY

## 2020-12-07 PROCEDURE — 85384 FIBRINOGEN ACTIVITY: CPT

## 2020-12-07 PROCEDURE — G0379 DIRECT REFER HOSPITAL OBSERV: HCPCS

## 2020-12-07 PROCEDURE — 25000003 PHARM REV CODE 250

## 2020-12-07 PROCEDURE — U0002 COVID-19 LAB TEST NON-CDC: HCPCS

## 2020-12-07 PROCEDURE — 71000033 HC RECOVERY, INTIAL HOUR: Performed by: OBSTETRICS & GYNECOLOGY

## 2020-12-07 PROCEDURE — D9220A PRA ANESTHESIA: Mod: ,,, | Performed by: ANESTHESIOLOGY

## 2020-12-07 PROCEDURE — 85025 COMPLETE CBC W/AUTO DIFF WBC: CPT

## 2020-12-07 PROCEDURE — 36004720 HC OB OR TIME LEV I - 1ST 15 MIN: Performed by: OBSTETRICS & GYNECOLOGY

## 2020-12-07 PROCEDURE — 88305 TISSUE EXAM BY PATHOLOGIST: CPT | Mod: 26,,, | Performed by: PATHOLOGY

## 2020-12-07 PROCEDURE — 80053 COMPREHEN METABOLIC PANEL: CPT

## 2020-12-07 PROCEDURE — 99219 PR INITIAL OBSERVATION CARE,LEVL II: CPT | Mod: ,,, | Performed by: OBSTETRICS & GYNECOLOGY

## 2020-12-07 PROCEDURE — 59841 PR INDUCED ABORTN BY DIL/EVAC: ICD-10-PCS | Mod: ,,, | Performed by: OBSTETRICS & GYNECOLOGY

## 2020-12-07 PROCEDURE — 99219 PR INITIAL OBSERVATION CARE,LEVL II: ICD-10-PCS | Mod: ,,, | Performed by: OBSTETRICS & GYNECOLOGY

## 2020-12-07 PROCEDURE — 96374 THER/PROPH/DIAG INJ IV PUSH: CPT

## 2020-12-07 PROCEDURE — 99285 EMERGENCY DEPT VISIT HI MDM: CPT | Mod: 25

## 2020-12-07 PROCEDURE — 36004721 HC OB OR TIME LEV I - EA ADD 15 MIN: Performed by: OBSTETRICS & GYNECOLOGY

## 2020-12-07 PROCEDURE — D9220A PRA ANESTHESIA: ICD-10-PCS | Mod: ,,, | Performed by: ANESTHESIOLOGY

## 2020-12-07 PROCEDURE — 85730 THROMBOPLASTIN TIME PARTIAL: CPT

## 2020-12-07 PROCEDURE — 86901 BLOOD TYPING SEROLOGIC RH(D): CPT

## 2020-12-07 RX ORDER — IBUPROFEN 600 MG/1
600 TABLET ORAL EVERY 6 HOURS
Status: DISCONTINUED | OUTPATIENT
Start: 2020-12-07 | End: 2020-12-07

## 2020-12-07 RX ORDER — OXYTOCIN/RINGER'S LACTATE 30/500 ML
95 PLASTIC BAG, INJECTION (ML) INTRAVENOUS ONCE
Status: DISCONTINUED | OUTPATIENT
Start: 2020-12-07 | End: 2020-12-07

## 2020-12-07 RX ORDER — IBUPROFEN 600 MG/1
600 TABLET ORAL EVERY 6 HOURS
Status: DISCONTINUED | OUTPATIENT
Start: 2020-12-07 | End: 2020-12-08 | Stop reason: HOSPADM

## 2020-12-07 RX ORDER — HYDROCORTISONE 25 MG/G
CREAM TOPICAL 3 TIMES DAILY PRN
Status: DISCONTINUED | OUTPATIENT
Start: 2020-12-07 | End: 2020-12-08 | Stop reason: HOSPADM

## 2020-12-07 RX ORDER — SODIUM CITRATE AND CITRIC ACID MONOHYDRATE 334; 500 MG/5ML; MG/5ML
30 SOLUTION ORAL ONCE
Status: COMPLETED | OUTPATIENT
Start: 2020-12-07 | End: 2020-12-07

## 2020-12-07 RX ORDER — CETIRIZINE HYDROCHLORIDE 5 MG/1
10 TABLET ORAL DAILY
Status: DISCONTINUED | OUTPATIENT
Start: 2020-12-08 | End: 2020-12-08 | Stop reason: HOSPADM

## 2020-12-07 RX ORDER — HYDROCODONE BITARTRATE AND ACETAMINOPHEN 10; 325 MG/1; MG/1
1 TABLET ORAL EVERY 4 HOURS PRN
Status: DISCONTINUED | OUTPATIENT
Start: 2020-12-07 | End: 2020-12-08 | Stop reason: HOSPADM

## 2020-12-07 RX ORDER — DIPHENHYDRAMINE HYDROCHLORIDE 50 MG/ML
25 INJECTION INTRAMUSCULAR; INTRAVENOUS EVERY 4 HOURS PRN
Status: DISCONTINUED | OUTPATIENT
Start: 2020-12-07 | End: 2020-12-08 | Stop reason: HOSPADM

## 2020-12-07 RX ORDER — ACETAMINOPHEN 325 MG/1
650 TABLET ORAL EVERY 6 HOURS PRN
Status: DISCONTINUED | OUTPATIENT
Start: 2020-12-07 | End: 2020-12-08 | Stop reason: HOSPADM

## 2020-12-07 RX ORDER — SIMETHICONE 80 MG
1 TABLET,CHEWABLE ORAL EVERY 6 HOURS PRN
Status: DISCONTINUED | OUTPATIENT
Start: 2020-12-07 | End: 2020-12-08 | Stop reason: HOSPADM

## 2020-12-07 RX ORDER — ONDANSETRON 2 MG/ML
INJECTION INTRAMUSCULAR; INTRAVENOUS
Status: DISCONTINUED | OUTPATIENT
Start: 2020-12-07 | End: 2020-12-07

## 2020-12-07 RX ORDER — FENTANYL CITRATE 50 UG/ML
INJECTION, SOLUTION INTRAMUSCULAR; INTRAVENOUS
Status: DISCONTINUED | OUTPATIENT
Start: 2020-12-07 | End: 2020-12-07

## 2020-12-07 RX ORDER — HYDROCORTISONE 25 MG/G
CREAM TOPICAL 3 TIMES DAILY PRN
Status: DISCONTINUED | OUTPATIENT
Start: 2020-12-07 | End: 2020-12-07

## 2020-12-07 RX ORDER — ACETAMINOPHEN 325 MG/1
650 TABLET ORAL EVERY 6 HOURS PRN
Status: DISCONTINUED | OUTPATIENT
Start: 2020-12-07 | End: 2020-12-07

## 2020-12-07 RX ORDER — SIMETHICONE 80 MG
1 TABLET,CHEWABLE ORAL EVERY 6 HOURS PRN
Status: DISCONTINUED | OUTPATIENT
Start: 2020-12-07 | End: 2020-12-07

## 2020-12-07 RX ORDER — ONDANSETRON 8 MG/1
8 TABLET, ORALLY DISINTEGRATING ORAL EVERY 8 HOURS PRN
Status: DISCONTINUED | OUTPATIENT
Start: 2020-12-07 | End: 2020-12-08 | Stop reason: HOSPADM

## 2020-12-07 RX ORDER — PRENATAL WITH FERROUS FUM AND FOLIC ACID 3080; 920; 120; 400; 22; 1.84; 3; 20; 10; 1; 12; 200; 27; 25; 2 [IU]/1; [IU]/1; MG/1; [IU]/1; MG/1; MG/1; MG/1; MG/1; MG/1; MG/1; UG/1; MG/1; MG/1; MG/1; MG/1
1 TABLET ORAL DAILY
Status: DISCONTINUED | OUTPATIENT
Start: 2020-12-07 | End: 2020-12-07

## 2020-12-07 RX ORDER — PRENATAL WITH FERROUS FUM AND FOLIC ACID 3080; 920; 120; 400; 22; 1.84; 3; 20; 10; 1; 12; 200; 27; 25; 2 [IU]/1; [IU]/1; MG/1; [IU]/1; MG/1; MG/1; MG/1; MG/1; MG/1; MG/1; UG/1; MG/1; MG/1; MG/1; MG/1
1 TABLET ORAL DAILY
Status: DISCONTINUED | OUTPATIENT
Start: 2020-12-07 | End: 2020-12-08 | Stop reason: HOSPADM

## 2020-12-07 RX ORDER — OXYTOCIN/RINGER'S LACTATE 30/500 ML
95 PLASTIC BAG, INJECTION (ML) INTRAVENOUS ONCE
Status: COMPLETED | OUTPATIENT
Start: 2020-12-07 | End: 2020-12-07

## 2020-12-07 RX ORDER — HYDROCODONE BITARTRATE AND ACETAMINOPHEN 5; 325 MG/1; MG/1
1 TABLET ORAL EVERY 4 HOURS PRN
Status: DISCONTINUED | OUTPATIENT
Start: 2020-12-07 | End: 2020-12-08 | Stop reason: HOSPADM

## 2020-12-07 RX ORDER — CLINDAMYCIN PHOSPHATE 900 MG/50ML
900 INJECTION, SOLUTION INTRAVENOUS
Status: COMPLETED | OUTPATIENT
Start: 2020-12-07 | End: 2020-12-08

## 2020-12-07 RX ORDER — DIPHENHYDRAMINE HYDROCHLORIDE 50 MG/ML
25 INJECTION INTRAMUSCULAR; INTRAVENOUS EVERY 4 HOURS PRN
Status: DISCONTINUED | OUTPATIENT
Start: 2020-12-07 | End: 2020-12-07

## 2020-12-07 RX ORDER — MISOPROSTOL 200 UG/1
TABLET ORAL
Status: COMPLETED
Start: 2020-12-07 | End: 2020-12-07

## 2020-12-07 RX ORDER — HYDROCODONE BITARTRATE AND ACETAMINOPHEN 5; 325 MG/1; MG/1
1 TABLET ORAL EVERY 4 HOURS PRN
Status: DISCONTINUED | OUTPATIENT
Start: 2020-12-07 | End: 2020-12-07

## 2020-12-07 RX ORDER — FAMOTIDINE 10 MG/ML
20 INJECTION INTRAVENOUS ONCE
Status: COMPLETED | OUTPATIENT
Start: 2020-12-07 | End: 2020-12-07

## 2020-12-07 RX ORDER — HYDROCODONE BITARTRATE AND ACETAMINOPHEN 10; 325 MG/1; MG/1
1 TABLET ORAL EVERY 4 HOURS PRN
Status: DISCONTINUED | OUTPATIENT
Start: 2020-12-07 | End: 2020-12-07

## 2020-12-07 RX ORDER — MIDAZOLAM HYDROCHLORIDE 1 MG/ML
INJECTION, SOLUTION INTRAMUSCULAR; INTRAVENOUS
Status: DISCONTINUED | OUTPATIENT
Start: 2020-12-07 | End: 2020-12-07

## 2020-12-07 RX ORDER — DOCUSATE SODIUM 100 MG/1
200 CAPSULE, LIQUID FILLED ORAL 2 TIMES DAILY PRN
Status: DISCONTINUED | OUTPATIENT
Start: 2020-12-07 | End: 2020-12-08 | Stop reason: HOSPADM

## 2020-12-07 RX ORDER — DOCUSATE SODIUM 100 MG/1
200 CAPSULE, LIQUID FILLED ORAL 2 TIMES DAILY PRN
Status: DISCONTINUED | OUTPATIENT
Start: 2020-12-07 | End: 2020-12-07

## 2020-12-07 RX ORDER — ONDANSETRON 8 MG/1
8 TABLET, ORALLY DISINTEGRATING ORAL EVERY 8 HOURS PRN
Status: DISCONTINUED | OUTPATIENT
Start: 2020-12-07 | End: 2020-12-07

## 2020-12-07 RX ORDER — DIPHENHYDRAMINE HCL 25 MG
25 CAPSULE ORAL EVERY 4 HOURS PRN
Status: DISCONTINUED | OUTPATIENT
Start: 2020-12-07 | End: 2020-12-07

## 2020-12-07 RX ORDER — MISOPROSTOL 200 UG/1
400 TABLET ORAL ONCE
Status: COMPLETED | OUTPATIENT
Start: 2020-12-07 | End: 2020-12-07

## 2020-12-07 RX ORDER — NIFEDIPINE 30 MG/1
30 TABLET, EXTENDED RELEASE ORAL DAILY
Status: DISCONTINUED | OUTPATIENT
Start: 2020-12-08 | End: 2020-12-08 | Stop reason: HOSPADM

## 2020-12-07 RX ORDER — DIPHENHYDRAMINE HCL 25 MG
25 CAPSULE ORAL EVERY 4 HOURS PRN
Status: DISCONTINUED | OUTPATIENT
Start: 2020-12-07 | End: 2020-12-08 | Stop reason: HOSPADM

## 2020-12-07 RX ORDER — DEXAMETHASONE SODIUM PHOSPHATE 100 MG/10ML
INJECTION INTRAMUSCULAR; INTRAVENOUS
Status: DISCONTINUED | OUTPATIENT
Start: 2020-12-07 | End: 2020-12-07

## 2020-12-07 RX ORDER — SODIUM CHLORIDE, SODIUM LACTATE, POTASSIUM CHLORIDE, CALCIUM CHLORIDE 600; 310; 30; 20 MG/100ML; MG/100ML; MG/100ML; MG/100ML
INJECTION, SOLUTION INTRAVENOUS CONTINUOUS PRN
Status: DISCONTINUED | OUTPATIENT
Start: 2020-12-07 | End: 2020-12-07

## 2020-12-07 RX ADMIN — MISOPROSTOL 400 MCG: 200 TABLET ORAL at 11:12

## 2020-12-07 RX ADMIN — SODIUM CHLORIDE, SODIUM LACTATE, POTASSIUM CHLORIDE, AND CALCIUM CHLORIDE: 600; 310; 30; 20 INJECTION, SOLUTION INTRAVENOUS at 12:12

## 2020-12-07 RX ADMIN — DEXAMETHASONE SODIUM PHOSPHATE 4 MG: 10 INJECTION INTRAMUSCULAR; INTRAVENOUS at 12:12

## 2020-12-07 RX ADMIN — ONDANSETRON 4 MG: 2 INJECTION INTRAMUSCULAR; INTRAVENOUS at 12:12

## 2020-12-07 RX ADMIN — FENTANYL CITRATE 10 MCG: 50 INJECTION, SOLUTION INTRAMUSCULAR; INTRAVENOUS at 12:12

## 2020-12-07 RX ADMIN — AMPICILLIN SODIUM 2 G: 2 INJECTION, POWDER, FOR SOLUTION INTRAMUSCULAR; INTRAVENOUS at 09:12

## 2020-12-07 RX ADMIN — AMPICILLIN SODIUM 2 G: 2 INJECTION, POWDER, FOR SOLUTION INTRAMUSCULAR; INTRAVENOUS at 03:12

## 2020-12-07 RX ADMIN — GENTAMICIN SULFATE 412.4 MG: 40 INJECTION, SOLUTION INTRAMUSCULAR; INTRAVENOUS at 03:12

## 2020-12-07 RX ADMIN — SODIUM CITRATE AND CITRIC ACID MONOHYDRATE 30 ML: 500; 334 SOLUTION ORAL at 11:12

## 2020-12-07 RX ADMIN — DOXYCYCLINE 200 MG: 100 INJECTION, POWDER, LYOPHILIZED, FOR SOLUTION INTRAVENOUS at 12:12

## 2020-12-07 RX ADMIN — HYDROCODONE BITARTRATE AND ACETAMINOPHEN 1 TABLET: 10; 325 TABLET ORAL at 04:12

## 2020-12-07 RX ADMIN — MIDAZOLAM HYDROCHLORIDE 2 MG: 1 INJECTION, SOLUTION INTRAMUSCULAR; INTRAVENOUS at 12:12

## 2020-12-07 RX ADMIN — CLINDAMYCIN IN 5 PERCENT DEXTROSE 900 MG: 18 INJECTION, SOLUTION INTRAVENOUS at 07:12

## 2020-12-07 RX ADMIN — MIDAZOLAM HYDROCHLORIDE 1 MG: 1 INJECTION, SOLUTION INTRAMUSCULAR; INTRAVENOUS at 12:12

## 2020-12-07 RX ADMIN — MEPIVACAINE HYDROCHLORIDE 45 MG: 15 INJECTION, SOLUTION EPIDURAL; INFILTRATION at 12:12

## 2020-12-07 RX ADMIN — FAMOTIDINE 20 MG: 10 INJECTION INTRAVENOUS at 11:12

## 2020-12-07 RX ADMIN — Medication 95 MILLI-UNITS/MIN: at 04:12

## 2020-12-07 NOTE — TELEPHONE ENCOUNTER
Contacted pt to inform her of Claritin refill being sent to the [facility] pharmacy on [address]. Pt did not answer, LVM with call back number.

## 2020-12-07 NOTE — PLAN OF CARE
20 1237   OB SCREEN   Source of Information health record   Received Prenatal Care Yes   Is this a teen pregnancy No   Is the baby in NICU No   Indication for adoption/Safe Haven No   Indication for DME/post-acute needs No   HIV (+) No   Any congenital  disorders No   Fetal demise/ death No     This patient has been screened for Social Work discharge planning needs. Based on  documentation in medical record , no discharge planning needs are anticipated at this time. Should any discharge planning needs arise, please consult . For urgent needs/consults, contact the  listed below at the number provided.     Jos Kat, [facility]  NICU   Phone [phone] Ext. [phone]  [email or web address]

## 2020-12-07 NOTE — ED PROVIDER NOTES
Encounter Date: 2020       History     Chief Complaint   Patient presents with    Vaginal Bleeding     Pt brought by EMS     Christina Stevens is a 25 y.o.  female with IUP at 18w6d weeks gestation who presents to the [facility] via EMS reporting profuse vaginal bleeding that began this morning at 8 AM. The patient reports that while using the restroom this morning she began to notice bright red active bleeding. She reports feeling some vaginal pressure prior to urination but denies any pain since that time. She denies any recent abnormal vaginal discharge or F/C. The patient has a known history of Di-Di twin gestation complicated by previable PPROM diagnosed during a a recent admission on 20. During that admission she delivered Twin A and the placenta of Twin A remained in situ. She was then observed and placed on IV antibiotics x 24 hours. The patient was then counseled on her management options and opted for outpatient expectant management,as this is a highly desired pregnancy. She was then discharged with precautions. Her post-operative course was uncomplicated until the aforementioned vaginal bleeding.     This IUP is complicated by cHTN, Di-Di twin pregnancy, morbid obesity, and previable  premature rupture of membranes.        Review of patient's allergies indicates:   Allergen Reactions    No known allergies      Past Medical History:   Diagnosis Date    Hypertension      Past Surgical History:   Procedure Laterality Date    DILATION AND CURETTAGE OF UTERUS N/A 2020    Procedure: DILATION AND EVACUATION;  Surgeon: Deepti Bearden MD;  Location: [facility] L&D;  Service: OB/GYN;  Laterality: N/A;     Family History   Problem Relation Age of Onset    Depression Mother     Hypertension Father     Diabetes Father     Breast cancer Neg Hx     Colon cancer Neg Hx     Ovarian cancer Neg Hx      Social History     Tobacco Use    Smoking status: Never Smoker    Smokeless tobacco: Never  Used   Substance Use Topics    Alcohol use: Not Currently     Comment: Occasionally.    Drug use: No     Review of Systems   Constitutional: Negative for chills, fatigue and fever.   HENT: Negative for congestion.    Eyes: Negative for visual disturbance.   Respiratory: Negative for shortness of breath.    Cardiovascular: Negative for chest pain.   Gastrointestinal: Negative for abdominal pain, nausea and vomiting.   Genitourinary: Positive for pelvic pain (Pressure prior to urination) and vaginal bleeding. Negative for dysuria and vaginal discharge.   Musculoskeletal: Negative for back pain.   Skin: Negative for rash.   Neurological: Negative for headaches.       Physical Exam     Initial Vitals   BP Pulse Resp Temp SpO2   12/07/20 0921 12/07/20 0921 12/07/20 0921 12/07/20 0920 12/07/20 0921   (!) 141/64 109 18 98.4 °F (36.9 °C) 98 %      MAP       --                Physical Exam    Vitals reviewed.  Constitutional: She appears well-developed and well-nourished. She appears distressed (Crying, appears distressed).   HENT:   Head: Normocephalic and atraumatic.   Eyes: EOM are normal.   Neck: Normal range of motion.   Cardiovascular:   Tachycardic to 110s   Pulmonary/Chest: Breath sounds normal. No respiratory distress.   Abdominal: Soft. There is no abdominal tenderness. There is no rebound and no guarding.   Gravid uterus, fundus non-TTP   Musculoskeletal: Normal range of motion. No edema.   Neurological: She is alert and oriented to person, place, and time.   Skin: Skin is warm and dry.   Psychiatric: Her behavior is normal. Thought content normal.     OB LABOR EXAM:       Method: Sterile vaginal exam per MD and Sterile speculum exam per MD.   Vaginal Bleeding: clots.     Dilatation: 2.   Station: -3.   Effacement: 70%.       Comments:   SSE: Copious dark red clots in vaginal canal  SVE: 1.5-2/70/-3, soft       ED Course   Procedures  Labs Reviewed   CBC W/ AUTO DIFFERENTIAL - Abnormal; Notable for the following  components:       Result Value    RBC 3.85 (*)     Hemoglobin 10.8 (*)     Hematocrit 32.6 (*)     Platelets 131 (*)     All other components within normal limits   COMPREHENSIVE METABOLIC PANEL - Abnormal; Notable for the following components:    CO2 22 (*)     Albumin 2.9 (*)     Alkaline Phosphatase 50 (*)     Anion Gap 6 (*)     All other components within normal limits   FIBRINOGEN - Abnormal; Notable for the following components:    Fibrinogen 482 (*)     All other components within normal limits   PROTIME-INR   APTT   SARS-COV-2 RNA AMPLIFICATION, QUAL   TYPE & SCREEN   PREPARE RBC SOFT   PREPARE FRESH FROZEN PLASMA SOFT   PREPARE RBC SOFT          Imaging Results    None          Medical Decision Making:   Initial Assessment:   Christina Stevens is a 25 y.o.  female with IUP at 18w6d weeks gestation who presents to the [facility] via EMS reporting profuse vaginal bleeding that began this morning at 8 AM.    ED Management:  FHT of Twin B confirmed at 147 BPM. Copious vaginal bleeding noted on exam, patient hemodynamically stable. STAT CBC and coags were collected. On BSUS, it appears that placenta of Baby A is in cervical canal.    Given her clinically significant vaginal bleeding on exam as well as potential maternal morbidity and mortality associated with expectant management, MFM recommends moving towards D&E. This was discussed with the patient who consented to procedure. Procedure and blood consents were discussed in detail with the patient. 2 units pRBCs held. Doxycycline OCTOR given suspected subclinical infectious process.    Other:   I have discussed this case with another health care provider.       <> Summary of the Discussion: Dr. Jackson, Dr. Steven, and Dr. Jackson              Attending Attestation:   Physician Attestation Statement for Resident:  As the supervising MD   Physician Attestation Statement: I have personally seen and examined this patient.   I agree with the above history. -:   As the  supervising MD I agree with the above PE.    As the supervising MD I agree with the above treatment, course, plan, and disposition.   -: Pt was seen by MFM attending and Dr. Bearden in [facility] with plans for D&E as outlined by DR. Denis's note.  I was personally present during the critical portions of the procedure(s) performed by the resident and was immediately available in the ED to provide services and assistance as needed during the entire procedure.                              Clinical Impression:     ICD-10-CM ICD-9-CM   1. Vaginal bleeding in pregnancy  O46.90 641.93   2.  premature rupture of membranes (PPROM) with unknown onset of labor  O42.919 658.10                          ED Disposition Condition    Send to L&D            Dee Denis M.D.  OB/GYN PGY-2     Dee Denis MD  Resident  20 1211       Jaclyn Jackson MD  20 [id number]

## 2020-12-07 NOTE — H&P
HISTORY AND PHYSICAL                                                OBSTETRICS          Subjective:       Christina Stevens is a 25 y.o.  female with IUP at 18w6d weeks gestation who presented to the OB ED reporting profuse vaginal bleeding that began this morning. The patient has a known history of Di-Di twin gestation complicated by previable PPROM diagnosed during a a recent admission on 20. During that admission she delivered Twin A and the placenta of Twin A remained in situ. She was then observed and placed on IV antibiotic. The patient was then counseled on her management option and opted for outpatient expectant management. She was then discharged with precautions. Her post-operative course was uncomplicated until the aforementioned vaginal bleeding.     In the [facility], the patient's vaginal bleeding was found to be heavy but her clinical status remained stable.  Fetal heart tones assessed in the ED were found to be in the 140s for Baby B. She was also found to be 2.0 cm dilated. The patient denied any systemic signs of infection.     The pregnancy in complicated by CHTN, Di-Di twin pregnancy and previable  premature rupture of membranes.    Review of Systems   Constitutional: Negative for chills and fever.   Eyes: Negative for visual disturbance.   Respiratory: Negative for shortness of breath.    Cardiovascular: Negative for chest pain.   Gastrointestinal: Negative for abdominal pain, diarrhea, nausea and vomiting.   Genitourinary: Positive for vaginal bleeding. Negative for dysuria, hematuria and vaginal discharge.   Musculoskeletal: Negative for back pain.   Integumentary:  Negative for rash.   Neurological: Negative for headaches.   Psychiatric/Behavioral: Negative.      PMHx:   Past Medical History:   Diagnosis Date    Hypertension        PSHx: No past surgical history on file.    All:   Review of patient's allergies indicates:   Allergen Reactions    No known allergies        Meds:  (Not in a hospital admission)      SH:   Social History     Socioeconomic History    Marital status: Single     Spouse name: Not on file    Number of children: Not on file    Years of education: Not on file    Highest education level: Not on file   Occupational History    Occupation: security at [employer]   Social Needs    Financial resource strain: Not on file    Food insecurity     Worry: Not on file     Inability: Not on file    Transportation needs     Medical: Not on file     Non-medical: Not on file   Tobacco Use    Smoking status: Never Smoker    Smokeless tobacco: Never Used   Substance and Sexual Activity    Alcohol use: Not Currently     Comment: Occasionally.    Drug use: No    Sexual activity: Yes     Partners: Male     Birth control/protection: None   Lifestyle    Physical activity     Days per week: Not on file     Minutes per session: Not on file    Stress: Not on file   Relationships    Social connections     Talks on phone: Not on file     Gets together: Not on file     Attends [employer] service: Not on file     Active member of club or organization: Not on file     Attends meetings of clubs or organizations: Not on file     Relationship status: Not on file   Other Topics Concern    Not on file   Social History Narrative    Not on file       FH:   Family History   Problem Relation Age of Onset    Depression Mother     Hypertension Father     Diabetes Father     Breast cancer Neg Hx     Colon cancer Neg Hx     Ovarian cancer Neg Hx        OBHx:   OB History    Para Term  AB Living   2 1 0 0 2 0   SAB TAB Ectopic Multiple Live Births   2 0 0 1 0      # Outcome Date GA Lbr Adria/2nd Weight Sex Delivery Anes PTL Lv   2A 20 15w6d  0.065 kg (2.3 oz) M Vag-Spont None Y FD      Name: WALLY PARISI      Apgar1: 0  Apgar5: 0   2B Para            2C Current            1 19 6w0d    SAB   FD       Objective:       /71   Pulse 99   Temp 98.4 °F  (36.9 °C)   Resp 18   LMP 2020   SpO2 98%   Breastfeeding No     Vitals:    20 0920 20 0921 20 0950   BP:  (!) 141/64 137/71   Pulse:  109 99   Resp:  18    Temp: 98.4 °F (36.9 °C)     SpO2:  98%        General:   alert, appears stated age and cooperative, no apparent distress   HENT:  normocephalic, atraumatic   Eyes:  extraocular movements and conjunctivae normal   Neck:  supple, range of motion normal, no thyromegaly   Lungs:   no respiratory distress   Heart:   regular rate   Abdomen:  soft, non-tender, non-distended but gravid, no rebound or guarding    FHT: 147 at bedside   Cervix:     Dilation: 1.5 cm    Effacement: 50%    Station:  -3   Sterile Speculum Exam: Copious Vaginal Clots in vault.    Lab Review    Recent Labs   Lab 20  0921   WBC 7.49   RBC 3.85*   HGB 10.8*   HCT 32.6*   *   MCV 85   MCH 28.1   MCHC 33.1          Assessment:       18w6d weeks gestation with Previable  Premature Rupture of Membranes with associated Hemorrhage    Active Hospital Problems    Diagnosis  POA    Vaginal bleeding in pregnancy [O46.90]  Yes      Resolved Hospital Problems   No resolved problems to display.          Plan:       Previable  Premature of Membranes with Associated Hemorrhage   - Patient diagnosed during recent admission and was discharged home with expectant management, now presents with heavy bleeding  - Patient hemodynamically stable. Will continue to monitor bleeding  - Fetal heart tones verified for Twin B which remains in situ   - Risks, benefits, alternatives and possible complications have been discussed in detail with the patient. Discussed role of D&E for surgical management.   - Given her clinically significant vaginal bleeding on exam as well as potential maternal morbidity and mortality associated with expectant management, recommend moving towards D&E which the patient consented to.  - Consents signed and to chart  - Admit to Labor and  Delivery unit  Cervix dilated to 2 cm on admit. No laminaria indicated at this time.   - Doxycycline 200 on call to OR  - Buccal Cytotec 200 mcg on call to OR  - Epidural per Anesthesia  - Draw CBC, T&S  - Notify Staff - Dr. Bearden aware  - 2 units held  - CBC - 10.8/32.6 on admit    Post-Partum Hemorrhage risk - high    Solo Jarvis M.D.  PGY-4 OB/GYN  [facility]

## 2020-12-07 NOTE — TRANSFER OF CARE
"Anesthesia Transfer of Care Note    Patient: Christina Stevens    Procedure(s) Performed: Procedure(s) (LRB):  DILATION AND EVACUATION (N/A)    Patient location: PACU    Anesthesia Type: CSE    Transport from OR: Transported from OR on room air with adequate spontaneous ventilation    Post pain: adequate analgesia    Post assessment: no apparent anesthetic complications and tolerated procedure well    Post vital signs: stable    Level of consciousness: awake, alert and oriented    Complications: none    Transfer of care protocol was followed      Last vitals:   Visit Vitals  /71   Pulse 99   Temp 36.9 °C (98.4 °F)   Resp 18   Ht 5' 4" (1.626 m)   Wt 124.3 kg (274 lb)   LMP 07/28/2020   SpO2 98%   Breastfeeding No   BMI 47.03 kg/m²     "

## 2020-12-07 NOTE — OP NOTE
[facility]    SUMMARY     Date of Procedure: 12/7/2020     Procedure: Procedure(s) (LRB):  DILATION AND EVACUATION (N/A)       Surgeon(s) and Role:     * Deepti Bearden MD - Primary     * Solo Jarvis MD - Resident - Assisting    Pre-Operative Diagnosis:   1. 18 week 6 day pregnancy (twin B)  2. Pre-Viable PPROM twin A @ 15 weeks - delivered, placenta retained  3. Heavy vaginal Bleeding    Post-Operative Diagnosis: Post-Op Diagnosis Codes:  Same plus  5. S/P Dilation and Evacuation  6. Chorioamnionitis    Anesthesia: Choice    Antibiotics: 200 mg Doxycycline IV, on call to OR    Technical Procedures Used: Suction D&C, dilation and evacuation, under ultrasound guidance    Operative Findings:   1. Cervix dilated to 2 cm on digital cervical exam  2. Umbilical cord transected prior to evacuation of fetal parts  3. All fetal parts accounted for post procedure  4. Foul smelling placental tissue consistent with the diagnosis of chorioamnionitis  5. Hemostasis at the conclusion of the case    Procedure Details: .   The patient was taken to the operating room. Time out was performed. She was then prepped and draped in the normal sterile fashion in candy cane in the dorsal lithotomy position. Adequate visualization of the cervix was achieved with the weighted speculum and the right angle retractor. An atraumatic Allis was placed on the anterior portion of the cervix. The Lorin then Le dilators were then used to dilate her cervix to roughly 3.5 cm. The Sopher forceps were then used to make an amniotomy yielding a moderate amount of clear amniotic fluid. The umbilical cord was then grasped and pulled through the cervical os. The cord was then transected and fetal demise was confirmed. The Sopher forceps were again introduced. After an upper extremity was evacuated from the cavity, the remaining fetal tissue was able to be delivered in breech fashion to the level of the  calvarium. The 8 mm suction curette was then used to decompress the calvarium at the occiput. After decompression, the remaining fetus delivered intact. Suction currettage with the 16 mm curette was performed back and forth on multiple passes until a gritty natural was felt on the endometrium and the bleeding subsided. The placental tissue was noted to be necrotic with a foul smell consistent with chorioamnionitis of likely placenta A. The patient had no active bleeding at the end of the procedure. The atraumatic tenaculum was removed. The weighted speculum was removed from the vagina. No vaginal lacerations were noted. A straight catheter was used to drain her bladder.  The patient tolerated the procedure well. Sponge, lap and needle counts were correct x 2.     Complications: None    Estimated Blood Loss (EBL): 400 cc    Specimens: Products of conception           Condition: Good    Disposition: PACU - hemodynamically stable.    Attestation: I performed the procedure.    Solo Jarvis M.D.  PGY-4 OB/GYN  [facility]

## 2020-12-07 NOTE — TELEPHONE ENCOUNTER
Pt was called regarding visitor policy  Pt very upset stating that she was bleeding.  Pt hung up the phone on Sha.  I called pt back and instructed her to go to the [facility] because of her bleeding.  [facility] notified of pt

## 2020-12-07 NOTE — NURSING
Patient presents to [facility] via EMS reporting profuse vaginal bleeding that began this morning. Di/Di twin pregnancy, patient reports IUFD of twin A on 11/16. 18g IV x 2 in place. Patient denies lightheadedness, chest pain, or shortness of breath. Patient is AAOx3. Fetal heart rate 146 verified by Dr. Gayle via ultrasound. Anesthesia, MFM staff and resident, charge RN, and this RN present at bedside.

## 2020-12-07 NOTE — ANESTHESIA PROCEDURE NOTES
CSE    Patient location during procedure: OR  Start time: 12/7/2020 12:20 PM  Timeout: 12/7/2020 12:15 PM  End time: 12/7/2020 12:44 PM    Staffing  Authorizing Provider: Mami Zuniga MD  Performing Provider: Mohamud Woodard MD    Preanesthetic Checklist  Completed: patient identified, site marked, surgical consent, pre-op evaluation, timeout performed, IV checked, risks and benefits discussed and monitors and equipment checked  CSE  Patient position: sitting  Prep: ChloraPrep  Patient monitoring: heart rate, continuous pulse ox, frequent blood pressure checks and cardiac monitor  Approach: midline  Spinal Needle  Needle type: Darling   Needle gauge: 25 G  Needle length: 5 in  Epidural Needle  Injection technique: SHABBIR saline  Needle type: Tuohy   Needle gauge: 17 G  Needle length: 5 in  Needle insertion depth: 9 cm  Location: L3-4  Needle localization: anatomical landmarks  Catheter  Catheter type: Stroz Friedberg  Catheter size: 19 G  Catheter at skin depth: 14 cm  Additional Documentation: incremental injection, negative aspiration for CSF and negative aspiration for heme  Assessment  Intrathecal Medications:  administered: primary anesthetic mcg of

## 2020-12-07 NOTE — PLAN OF CARE
Patient was not available.   provided a compassionate presence and grief care for family and will follow up with patient.

## 2020-12-07 NOTE — ANESTHESIA PREPROCEDURE EVALUATION
2020  Christina Stevens is a 25 y.o. female     OB History    Para Term  AB Living   2 1 0 0 2 0   SAB TAB Ectopic Multiple Live Births   2     1 0      # Outcome Date GA Lbr Adria/2nd Weight Sex Delivery Anes PTL Lv   2A SAB 20 15w6d  0.065 kg (2.3 oz) M Vag-Spont None Y FD   2B Para            2C Current            1 SAB 19 6w0d    SAB   FD       Wt Readings from Last 1 Encounters:   20 1100 124.3 kg (274 lb)       BP Readings from Last 3 Encounters:   20 137/71   20 (!) 138/92   20 127/63       Patient Active Problem List   Diagnosis    Depression    Allergic rhinitis    Dichorionic diamniotic twin pregnancy in second trimester    Morbid obesity with BMI of 45.0-49.9, adult    Hypertension affecting pregnancy in first trimester    Threatened  in second trimester    IUFD at less than 20 weeks of gestation    Vaginal bleeding in pregnancy       History reviewed. No pertinent surgical history.    Social History     Socioeconomic History    Marital status: Single     Spouse name: Not on file    Number of children: Not on file    Years of education: Not on file    Highest education level: Not on file   Occupational History    Occupation: security at [employer]   Social Needs    Financial resource strain: Not on file    Food insecurity     Worry: Not on file     Inability: Not on file    Transportation needs     Medical: Not on file     Non-medical: Not on file   Tobacco Use    Smoking status: Never Smoker    Smokeless tobacco: Never Used   Substance and Sexual Activity    Alcohol use: Not Currently     Comment: Occasionally.    Drug use: No    Sexual activity: Yes     Partners: Male     Birth control/protection: None   Lifestyle    Physical activity     Days per week: Not on file     Minutes per session: Not on file    Stress: Not on  file   Relationships    Social connections     Talks on phone: Not on file     Gets together: Not on file     Attends Yazidi service: Not on file     Active member of club or organization: Not on file     Attends meetings of clubs or organizations: Not on file     Relationship status: Not on file   Other Topics Concern    Not on file   Social History Narrative    Not on file         Chemistry        Component Value Date/Time     12/07/2020 0921    K 3.7 12/07/2020 0921     12/07/2020 0921    CO2 22 (L) 12/07/2020 0921    BUN 6 12/07/2020 0921    CREATININE 0.5 12/07/2020 0921    GLU 95 12/07/2020 0921        Component Value Date/Time    CALCIUM 8.7 12/07/2020 0921    ALKPHOS 50 (L) 12/07/2020 0921    AST 17 12/07/2020 0921    ALT 17 12/07/2020 0921    BILITOT 0.2 12/07/2020 0921    ESTGFRAFRICA >60 12/07/2020 0921    EGFRNONAA >60 12/07/2020 0921            Lab Results   Component Value Date    WBC 7.49 12/07/2020    HGB 10.8 (L) 12/07/2020    HCT 32.6 (L) 12/07/2020    MCV 85 12/07/2020     (L) 12/07/2020       Recent Labs     12/07/20  0921   INR 0.9   APTT 31.0         Anesthesia Evaluation    I have reviewed the Patient Summary Reports.         Review of Systems  Anesthesia Hx:  History of prior surgery of interest to airway management or planning: Denies Family Hx of Anesthesia complications.   Denies Personal Hx of Anesthesia complications.   Social:  Non-Smoker    Hematology/Oncology:  Hematology Normal   Oncology Normal     EENT/Dental:EENT/Dental Normal   Cardiovascular:   Hypertension Denies MI.          Pulmonary:   Denies COPD.  Denies Asthma.    Hepatic/GI:  Hepatic/GI Normal    Musculoskeletal:  Denies Spine Disorders    Neurological:   Denies CVA. Denies Seizures.    Endocrine:   Denies Diabetes.    Psych:   Psychiatric History          Physical Exam  General:  Obesity    Airway/Jaw/Neck:  Airway Findings: Mouth Opening: Normal General Airway Assessment: Adult  Mallampati: II          Dental:  DENTAL FINDINGS: Normal   Chest/Lungs:  Chest/Lungs Clear    Heart/Vascular:  Heart Findings: Normal Heart murmur: negative       Mental Status:  Mental Status Findings: Normal        Anesthesia Plan  Type of Anesthesia, risks & benefits discussed:  Anesthesia Type:  epidural, spinal, CSE, general  Patient's Preference:   Intra-op Monitoring Plan: standard ASA monitors  Intra-op Monitoring Plan Comments:   Post Op Pain Control Plan: epidural analgesia and intrathecal opioid  Post Op Pain Control Plan Comments:   Induction:   IV  Beta Blocker:  Patient is not currently on a Beta-Blocker (No further documentation required).       Informed Consent: Patient understands risks and agrees with Anesthesia plan.  Questions answered. Anesthesia consent signed with patient.  ASA Score: 3     Day of Surgery Review of History & Physical: I have interviewed and examined the patient. I have reviewed the patient's H&P dated:    H&P update referred to the provider.         Ready For Surgery From Anesthesia Perspective.

## 2020-12-07 NOTE — PLAN OF CARE
" provided a compassionate presence, prayer support, grief care and conversation regarding decisions near end of life for patient.  Patient selected fetal respectful burial option and completed "Consent for Fetal Respectful Burial" and "Authorization for Release of Confidential Information" forms.    "

## 2020-12-07 NOTE — TELEPHONE ENCOUNTER
Spoke to patient. Patient states she just started bleeding this morning and has blood running down her leg. Patient advised to go the [facility] Emergency Department immediately for evaluation.

## 2020-12-08 VITALS
HEIGHT: 64 IN | SYSTOLIC BLOOD PRESSURE: 145 MMHG | RESPIRATION RATE: 18 BRPM | BODY MASS INDEX: 46.78 KG/M2 | TEMPERATURE: 98 F | WEIGHT: 274 LBS | OXYGEN SATURATION: 96 % | HEART RATE: 80 BPM | DIASTOLIC BLOOD PRESSURE: 70 MMHG

## 2020-12-08 PROBLEM — O41.1290 CHORIOAMNIONITIS: Status: ACTIVE | Noted: 2020-12-08

## 2020-12-08 LAB
BASOPHILS # BLD AUTO: 0.02 K/UL (ref 0–0.2)
BASOPHILS NFR BLD: 0.1 % (ref 0–1.9)
DIFFERENTIAL METHOD: ABNORMAL
EOSINOPHIL # BLD AUTO: 0 K/UL (ref 0–0.5)
EOSINOPHIL NFR BLD: 0 % (ref 0–8)
ERYTHROCYTE [DISTWIDTH] IN BLOOD BY AUTOMATED COUNT: 13.1 % (ref 11.5–14.5)
HCT VFR BLD AUTO: 29.3 % (ref 37–48.5)
HGB BLD-MCNC: 9.9 G/DL (ref 12–16)
IMM GRANULOCYTES # BLD AUTO: 0.07 K/UL (ref 0–0.04)
IMM GRANULOCYTES NFR BLD AUTO: 0.5 % (ref 0–0.5)
LYMPHOCYTES # BLD AUTO: 1.6 K/UL (ref 1–4.8)
LYMPHOCYTES NFR BLD: 11 % (ref 18–48)
MCH RBC QN AUTO: 28.6 PG (ref 27–31)
MCHC RBC AUTO-ENTMCNC: 33.8 G/DL (ref 32–36)
MCV RBC AUTO: 85 FL (ref 82–98)
MONOCYTES # BLD AUTO: 1 K/UL (ref 0.3–1)
MONOCYTES NFR BLD: 7 % (ref 4–15)
NEUTROPHILS # BLD AUTO: 11.6 K/UL (ref 1.8–7.7)
NEUTROPHILS NFR BLD: 81.4 % (ref 38–73)
NRBC BLD-RTO: 0 /100 WBC
PLATELET # BLD AUTO: 127 K/UL (ref 150–350)
PMV BLD AUTO: 10.1 FL (ref 9.2–12.9)
RBC # BLD AUTO: 3.46 M/UL (ref 4–5.4)
WBC # BLD AUTO: 14.24 K/UL (ref 3.9–12.7)

## 2020-12-08 PROCEDURE — 63600175 PHARM REV CODE 636 W HCPCS: Performed by: STUDENT IN AN ORGANIZED HEALTH CARE EDUCATION/TRAINING PROGRAM

## 2020-12-08 PROCEDURE — G0378 HOSPITAL OBSERVATION PER HR: HCPCS

## 2020-12-08 PROCEDURE — 25000003 PHARM REV CODE 250: Performed by: STUDENT IN AN ORGANIZED HEALTH CARE EDUCATION/TRAINING PROGRAM

## 2020-12-08 PROCEDURE — 85025 COMPLETE CBC W/AUTO DIFF WBC: CPT

## 2020-12-08 PROCEDURE — 36415 COLL VENOUS BLD VENIPUNCTURE: CPT

## 2020-12-08 PROCEDURE — 96376 TX/PRO/DX INJ SAME DRUG ADON: CPT

## 2020-12-08 PROCEDURE — 25000003 PHARM REV CODE 250: Performed by: OBSTETRICS & GYNECOLOGY

## 2020-12-08 PROCEDURE — 99024 PR POST-OP FOLLOW-UP VISIT: ICD-10-PCS | Mod: ,,, | Performed by: OBSTETRICS & GYNECOLOGY

## 2020-12-08 PROCEDURE — 96372 THER/PROPH/DIAG INJ SC/IM: CPT | Mod: 59

## 2020-12-08 PROCEDURE — 99024 POSTOP FOLLOW-UP VISIT: CPT | Mod: ,,, | Performed by: OBSTETRICS & GYNECOLOGY

## 2020-12-08 RX ORDER — ENOXAPARIN SODIUM 100 MG/ML
40 INJECTION SUBCUTANEOUS EVERY 12 HOURS
Status: DISCONTINUED | OUTPATIENT
Start: 2020-12-08 | End: 2020-12-08 | Stop reason: HOSPADM

## 2020-12-08 RX ORDER — SERTRALINE HYDROCHLORIDE 50 MG/1
50 TABLET, FILM COATED ORAL DAILY
Qty: 30 TABLET | Refills: 11 | Status: SHIPPED | OUTPATIENT
Start: 2020-12-09 | End: 2021-01-08

## 2020-12-08 RX ORDER — SERTRALINE HYDROCHLORIDE 50 MG/1
50 TABLET, FILM COATED ORAL DAILY
Status: DISCONTINUED | OUTPATIENT
Start: 2020-12-08 | End: 2020-12-08 | Stop reason: HOSPADM

## 2020-12-08 RX ORDER — DOCUSATE SODIUM 100 MG/1
200 CAPSULE, LIQUID FILLED ORAL 2 TIMES DAILY PRN
Qty: 30 CAPSULE | Refills: 1 | Status: SHIPPED | OUTPATIENT
Start: 2020-12-08

## 2020-12-08 RX ORDER — HYDROCODONE BITARTRATE AND ACETAMINOPHEN 5; 325 MG/1; MG/1
1 TABLET ORAL EVERY 4 HOURS PRN
Qty: 10 TABLET | Refills: 0 | Status: SHIPPED | OUTPATIENT
Start: 2020-12-08

## 2020-12-08 RX ORDER — IBUPROFEN 600 MG/1
600 TABLET ORAL EVERY 6 HOURS PRN
Qty: 30 TABLET | Refills: 1 | Status: SHIPPED | OUTPATIENT
Start: 2020-12-08 | End: 2021-04-05 | Stop reason: SDUPTHER

## 2020-12-08 RX ADMIN — PRENATAL VIT W/ FE FUMARATE-FA TAB 27-0.8 MG 1 TABLET: 27-0.8 TAB at 09:12

## 2020-12-08 RX ADMIN — ACETAMINOPHEN 650 MG: 325 TABLET, FILM COATED ORAL at 09:12

## 2020-12-08 RX ADMIN — IBUPROFEN 600 MG: 600 TABLET, FILM COATED ORAL at 12:12

## 2020-12-08 RX ADMIN — SERTRALINE HYDROCHLORIDE 50 MG: 50 TABLET ORAL at 12:12

## 2020-12-08 RX ADMIN — AMPICILLIN SODIUM 2 G: 2 INJECTION, POWDER, FOR SOLUTION INTRAMUSCULAR; INTRAVENOUS at 03:12

## 2020-12-08 RX ADMIN — CLINDAMYCIN IN 5 PERCENT DEXTROSE 900 MG: 18 INJECTION, SOLUTION INTRAVENOUS at 11:12

## 2020-12-08 RX ADMIN — IBUPROFEN 600 MG: 600 TABLET, FILM COATED ORAL at 11:12

## 2020-12-08 RX ADMIN — CLINDAMYCIN IN 5 PERCENT DEXTROSE 900 MG: 18 INJECTION, SOLUTION INTRAVENOUS at 03:12

## 2020-12-08 RX ADMIN — ENOXAPARIN SODIUM 40 MG: 40 INJECTION SUBCUTANEOUS at 11:12

## 2020-12-08 RX ADMIN — NIFEDIPINE 30 MG: 30 TABLET, FILM COATED, EXTENDED RELEASE ORAL at 09:12

## 2020-12-08 RX ADMIN — CETIRIZINE HYDROCHLORIDE 10 MG: 5 TABLET ORAL at 09:12

## 2020-12-08 RX ADMIN — AMPICILLIN SODIUM 2 G: 2 INJECTION, POWDER, FOR SOLUTION INTRAMUSCULAR; INTRAVENOUS at 09:12

## 2020-12-08 RX ADMIN — IBUPROFEN 600 MG: 600 TABLET, FILM COATED ORAL at 06:12

## 2020-12-08 NOTE — DISCHARGE SUMMARY
Discharge Summary  Gynecology      Admit Date: 2020    Discharge Date and Time: 2020    Attending Physician: Deepti Bearden MD    Principal Diagnoses: S/P dilation and curettage    Active Hospital Problems    Diagnosis  POA    *S/P dilation and evacuation [Z98.890]  Not Applicable    Vaginal bleeding in pregnancy [O46.90]  Yes      Resolved Hospital Problems    Diagnosis Date Resolved POA    Vaginal bleeding in pregnancy [O46.90] 2020 Yes       Procedures: Procedure(s) (LRB):  DILATION AND EVACUATION (N/A)    Discharged Condition: good     HPI: Christina Stevens is a 25 y.o.  female with IUP at 18w6d weeks gestation who presented to the [facility] via EMS reporting profuse vaginal bleeding that began this morning at 8 AM. The patient reports that while using the restroom this morning she began to notice bright red active bleeding. She reports feeling some vaginal pressure prior to urination but denies any pain since that time. She denies any recent abnormal vaginal discharge or F/C. The patient has a known history of Di-Di twin gestation complicated by previable PPROM diagnosed during a a recent admission on 20. During that admission she delivered Twin A and the placenta of Twin A remained in situ. She was then observed and placed on IV antibiotics x 24 hours. The patient was then counseled on her management options and opted for outpatient expectant management, as this is a highly desired pregnancy. She was then discharged with precautions. Her post-operative course was uncomplicated until the aforementioned vaginal bleeding.     In the [facility], the patient's vaginal bleeding was found to be heavy but her vitals and clinical status remained stable. Fetal heart tones assessed in the ED were found to be in the 140s for Baby B. She was also found to be 2.0 cm dilated. The patient denied any systemic signs of infection. Given her clinically significant vaginal bleeding on exam as well as  potential maternal morbidity and mortality associated with expectant management, [facility] recommended proceeding with D&E for management. This was discussed with the patient, who consented to procedure.    Hospital Course:   12/7/2020: Admitted for D&E in the setting of significant vaginal bleeding with known previable PPROM in di-di twin pregnancy. After extensive MFM counseling, D&E was recommended given significant maternal risks and suspected subclinical infection. Patient was passed back to OR for D&E. Please see OP note for further details. Chorioamnionitis was diagnosed intra-operatively given foul smelling placental tissue. IV antibiotics were ordered. Pt tolerated procedure well and patient was taken to recovery in a stable condition.  has visited with patient and provided appropriate counseling services, patient and family coping well overall.  12/8/2020: POD #1 s/p D&E. Patient meeting all post-operative milestones. IV antibiotics continued for 24 hours post-operatively. Patient requested to start Zoloft prior to hospital discharge. Prior to hospital discharge, patient was able to void, ambulate, tolerate PO and pain was well controlled with PO meds. Patient was given routine post-op instructions for which patient voiced understanding. Patient was subsequently discharged home with close follow up in place with Dr. Reynaga.    Consults:     Significant Diagnostic Studies:  Recent Labs   Lab 12/07/20  0921 12/08/20  0539   WBC 7.49 14.24*   HGB 10.8* 9.9*   HCT 32.6* 29.3*   MCV 85 85   * 127*        Treatments:   1. Surgery as above    Disposition: Home or Self Care    Patient Instructions:   Current Discharge Medication List      START taking these medications    Details   docusate sodium (COLACE) 100 MG capsule Take 2 capsules (200 mg total) by mouth 2 (two) times daily as needed for Constipation.  Qty: 30 capsule, Refills: 1      HYDROcodone-acetaminophen (NORCO) 5-325 mg per tablet Take  1 tablet by mouth every 4 (four) hours as needed.  Qty: 10 tablet, Refills: 0    Comments: Quantity prescribed more than 7 day supply? No      ibuprofen (ADVIL,MOTRIN) 600 MG tablet Take 1 tablet (600 mg total) by mouth every 6 (six) hours as needed for Pain.  Qty: 30 tablet, Refills: 1      sertraline (ZOLOFT) 50 MG tablet Take 1 tablet (50 mg total) by mouth once daily.  Qty: 30 tablet, Refills: 11         CONTINUE these medications which have NOT CHANGED    Details   fluticasone propionate (FLONASE) 50 mcg/actuation nasal spray 1 spray (50 mcg total) by Each Nostril route once daily.  Qty: 16 g, Refills: 2      loratadine (CLARITIN) 10 mg tablet Take 1 tablet (10 mg total) by mouth daily as needed for Allergies.  Qty: 30 tablet, Refills: 6      NIFEdipine (ADALAT CC) 30 MG TbSR Take 1 tablet (30 mg total) by mouth once daily.  Qty: 30 tablet, Refills: 6    Comments: .      ondansetron (ZOFRAN) 4 MG tablet Take 1 tablet (4 mg total) by mouth every 8 (eight) hours as needed for Nausea.  Qty: 30 tablet, Refills: 1    Associated Diagnoses: Nausea/vomiting in pregnancy             Discharge Procedure Orders   Diet Adult Regular     Pelvic Rest   Order Comments: Nothing in vagina (no sex, tampons, or douching) for 4 weeks     Lifting restrictions   Order Comments: Avoid heavy lifting for the next 4 weeks     No driving until:   Order Comments: Not taking narcotic pain medications     Notify your health care provider if you experience any of the following:  temperature >100.4     Notify your health care provider if you experience any of the following:  persistent nausea and vomiting or diarrhea     Notify your health care provider if you experience any of the following:  severe uncontrolled pain     Notify your health care provider if you experience any of the following:  redness, tenderness, or signs of infection (pain, swelling, redness, odor or green/yellow discharge around incision site)     Notify your health care  provider if you experience any of the following:  difficulty breathing or increased cough     Notify your health care provider if you experience any of the following:  severe persistent headache     Notify your health care provider if you experience any of the following:  worsening rash     Notify your health care provider if you experience any of the following:  persistent dizziness, light-headedness, or visual disturbances     Notify your health care provider if you experience any of the following:  increased confusion or weakness     Notify your health care provider if you experience any of the following:   Order Comments: - Heavy vaginal bleeding (soaking through more than 1 pad/hour for more than 1 hour)  - Abnormal or malodorous vaginal discharge  - Severe abdominal pain     Activity as tolerated       Follow-up Information     Connie Reynaga MD. Go on 12/11/2020.    Specialty: Obstetrics and Gynecology  Why: Post-operative Check/Mood Check  Contact information:  [address] [place] [id number] [phone]             Connie Reynaga MD. Go on 12/22/2020.    Specialty: Obstetrics and Gynecology  Why: Hospital Follow Up  Contact information:  [address] [id number] [phone]                   Dee Denis M.D.  OB/GYN PGY-2

## 2020-12-08 NOTE — ANESTHESIA POSTPROCEDURE EVALUATION
Anesthesia Post Evaluation    Patient: Christina Stevens    Procedure(s) Performed: Procedure(s) (LRB):  DILATION AND EVACUATION (N/A)    Final Anesthesia Type: CSE    Patient location during evaluation: floor  Patient participation: Yes- Able to Participate  Level of consciousness: awake and alert and oriented  Post-procedure vital signs: reviewed and stable  Pain management: adequate  Airway patency: patent  SHUBHAM mitigation strategies: Use of major conduction anesthesia (spinal/epidural) or peripheral nerve block  PONV status at discharge: No PONV  Anesthetic complications: no      Cardiovascular status: blood pressure returned to baseline  Respiratory status: unassisted, spontaneous ventilation and room air  Hydration status: euvolemic  Follow-up not needed.          Vitals Value Taken Time   /63 12/08/20 1344   Temp 36.7 °C (98.1 °F) 12/08/20 1344   Pulse 77 12/08/20 1344   Resp 18 12/08/20 1344   SpO2 100 % 12/08/20 1344         Event Time   Out of Recovery 17:32:00         Pain/Mary Score: Pain Rating Prior to Med Admin: 6 (12/8/2020 11:21 AM)

## 2020-12-08 NOTE — PROGRESS NOTES
Pt doing ok. Pt currently grieving appropriately.  Pt noted to be smiling, and laughing toward end of shift.  Pt aware of discharge. States currently waiting on ride. IV x2 removed. VS remained stable throughout shift. Discharge instructions reviewed with pt. Pt currently denies HA, blurry vision, dizziness. Pt did take shower. Call ligt6 within reach will continue to monitor

## 2020-12-08 NOTE — PROGRESS NOTES
POSTPARTUM PROGRESS NOTE    Christina Stevens is a 25 y.o. female POD #1 status post dilation and evacuation at 18w6d in a pregnancy complicated by previable PPROM in a di-di twin pregnancy. The patient was originally diagnosed with previable PPROM with subsequent precipitous delivery of Twin A during admission 11/16/2020. The patient had been expectantly managed with close outpatient follow up until she presented with heavy vaginal bleeding in the [facility] on 12/7. She was counseled on options for management given maternal risks of hemorrhage and infection and elected for D&E. Procedure was uncomplicated and the patient was diagnosed with chorioamnionitis intra-operatively. IV antibiotics were then initiated.    Patient is doing well this morning. She denies nausea, vomiting, fever or chills. Patient reports mild abdominal cramping that is adequately relieved by oral pain medications. Lochia is mild to moderate and stable. Patient is voiding without difficulty and ambulating with no difficulty. She is coping well overall with the loss of her babies and feels supported by her family and hospital staff.    Objective:       Temp:  [96.4 °F (35.8 °C)-98.6 °F (37 °C)] 97.8 °F (36.6 °C)  Pulse:  [] 86  Resp:  [16-18] 18  SpO2:  [99 %-100 %] 100 %  BP: (127-164)/(58-87) 135/74    General:   Alert, appears stated age and cooperative   Lungs:   Non-labored respirations    Heart:   Regular rate and rhythm   Abdomen:  Soft, non-distended    Uterus: No fundal TTP   Extremities: No pedal edema noted     Lab Review  No results found for this or any previous visit (from the past 4 hour(s)).    I/O    Intake/Output Summary (Last 24 hours) at 12/8/2020 0948  Last data filed at 12/7/2020 1334  Gross per 24 hour   Intake 900 ml   Output 1200 ml   Net -300 ml        Assessment:     Patient Active Problem List   Diagnosis    Depression    Allergic rhinitis    Dichorionic diamniotic twin pregnancy in second trimester    Morbid obesity  with BMI of 45.0-49.9, adult    Hypertension affecting pregnancy in first trimester    Threatened  in second trimester    IUFD at less than 20 weeks of gestation    S/P dilation and evacuation    Vaginal bleeding in pregnancy        Plan:   1. Postoperative care:  - Patient doing well. Continue routine management and advances.  - Continue PO pain meds. Pain well controlled.  - Heme: H/h stable  >>> 10/29  - Encourage ambulation  - Contraception to be discussed at 6 week post-operative visit  - Rh positive  -  has visited with patient and provided appropriate counseling services, patient coping well overall    2. Chorioamnionitis:  - Continue Amp/Gent/Clinda for 24 hours post-operatively  - Has remained afebrile since admission  - WBC 14 this AM    3. Chronic HTN:   - BP: (127-164)/(58-87) 135/74  - Continue home Procardia 30 XL    4. Morbid obesity:  - BMI 47  - Given chorioamnionitis and morbid obesity, will treat with Lovenox 40 BID for DVT prophylaxis  - SCDs in bed      Dispo: As patient meets milestones, will plan to discharge on POD #1 if patient remains afebrile and bleeding remains stable.      Dee Denis M.D.  OB/GYN PGY-2

## 2020-12-09 LAB
BLD PROD TYP BPU: NORMAL
BLD PROD TYP BPU: NORMAL
BLOOD UNIT EXPIRATION DATE: NORMAL
BLOOD UNIT EXPIRATION DATE: NORMAL
BLOOD UNIT TYPE CODE: 9500
BLOOD UNIT TYPE CODE: 9500
BLOOD UNIT TYPE: NORMAL
BLOOD UNIT TYPE: NORMAL
CODING SYSTEM: NORMAL
CODING SYSTEM: NORMAL
DISPENSE STATUS: NORMAL
DISPENSE STATUS: NORMAL
NUM UNITS TRANS PACKED RBC: NORMAL
NUM UNITS TRANS PACKED RBC: NORMAL

## 2020-12-11 ENCOUNTER — ROUTINE PRENATAL (OUTPATIENT)
Dept: OBSTETRICS AND GYNECOLOGY | Facility: CLINIC | Age: 25
End: 2020-12-11
Payer: COMMERCIAL

## 2020-12-11 VITALS
BODY MASS INDEX: 47.64 KG/M2 | DIASTOLIC BLOOD PRESSURE: 78 MMHG | SYSTOLIC BLOOD PRESSURE: 152 MMHG | WEIGHT: 277.56 LBS

## 2020-12-11 DIAGNOSIS — O02.1 IUFD AT LESS THAN 20 WEEKS OF GESTATION: Primary | ICD-10-CM

## 2020-12-11 DIAGNOSIS — I10 CHRONIC HYPERTENSION: ICD-10-CM

## 2020-12-11 DIAGNOSIS — Z98.890 S/P DILATION AND CURETTAGE: ICD-10-CM

## 2020-12-11 DIAGNOSIS — E66.01 OBESITY, CLASS III, BMI 40-49.9 (MORBID OBESITY): ICD-10-CM

## 2020-12-11 PROCEDURE — 99999 PR PBB SHADOW E&M-EST. PATIENT-LVL III: ICD-10-PCS | Mod: PBBFAC,,, | Performed by: OBSTETRICS & GYNECOLOGY

## 2020-12-11 PROCEDURE — 0503F PR POSTPARTUM CARE VISIT: ICD-10-PCS | Mod: S$GLB,,, | Performed by: OBSTETRICS & GYNECOLOGY

## 2020-12-11 PROCEDURE — 0503F POSTPARTUM CARE VISIT: CPT | Mod: S$GLB,,, | Performed by: OBSTETRICS & GYNECOLOGY

## 2020-12-11 PROCEDURE — 99999 PR PBB SHADOW E&M-EST. PATIENT-LVL III: CPT | Mod: PBBFAC,,, | Performed by: OBSTETRICS & GYNECOLOGY

## 2020-12-11 NOTE — PROGRESS NOTES
"Pt here s/p  of 2nd twin after abruption and PTL.  Pt already delivered twin A at 15 weeks after PPROM.  2nd twin delivered at 18w6d.  Pt reports doing well today.  Notes that she is coping appropriately since the loss of her pregnancy.  Pt was started on Zoloft in the hospital and feels like the zoloft is helping.  Denies any SI/HI.  Pt is staying with her mom currently and feels that that is helping.  Pt reports that she can't help but feel like the losses are her fault because she is not "healthy."  Pt would like to work on weight loss this year, which she is hoping will help her BP as well.  Pt is known TN and takes procardia 30 XL.  /78 today but patient reports that it is not normally this tawanna.  No PreE symptoms.  Pt has been checking BP at home.  Notes minimal vaginal bleeding.  NO issues at this time.  Denies fever/chills    In clinic, pt is grieving appropriately.  Appears in good spirits    Spoke in detail about the losses and how they are not her fault.  Will continue to monitor BP.  Pt to call if increases  Talked about weight loss.  Will refer to weight loss management.  Will place referral.       Connie Reynaga MD  Obstetrics & Gynecology    "

## 2020-12-14 LAB
FINAL PATHOLOGIC DIAGNOSIS: NORMAL
GROSS: NORMAL
Lab: NORMAL

## 2020-12-31 NOTE — ADDENDUM NOTE
Addendum  created 12/31/20 0937 by Mohamud Woodard MD    Clinical Note Signed, Intraprocedure Blocks edited

## 2021-01-08 ENCOUNTER — TELEPHONE (OUTPATIENT)
Dept: OBSTETRICS AND GYNECOLOGY | Facility: CLINIC | Age: 26
End: 2021-01-08

## 2021-01-08 ENCOUNTER — OFFICE VISIT (OUTPATIENT)
Dept: OBSTETRICS AND GYNECOLOGY | Facility: CLINIC | Age: 26
End: 2021-01-08
Payer: COMMERCIAL

## 2021-01-08 VITALS
SYSTOLIC BLOOD PRESSURE: 140 MMHG | HEIGHT: 64 IN | DIASTOLIC BLOOD PRESSURE: 78 MMHG | BODY MASS INDEX: 47.19 KG/M2 | WEIGHT: 276.44 LBS

## 2021-01-08 DIAGNOSIS — F32.A DEPRESSION, UNSPECIFIED DEPRESSION TYPE: Primary | ICD-10-CM

## 2021-01-08 PROCEDURE — 99999 PR PBB SHADOW E&M-EST. PATIENT-LVL III: ICD-10-PCS | Mod: PBBFAC,,, | Performed by: OBSTETRICS & GYNECOLOGY

## 2021-01-08 PROCEDURE — 99999 PR PBB SHADOW E&M-EST. PATIENT-LVL III: CPT | Mod: PBBFAC,,, | Performed by: OBSTETRICS & GYNECOLOGY

## 2021-01-08 PROCEDURE — 99215 OFFICE O/P EST HI 40 MIN: CPT | Mod: S$GLB,,, | Performed by: OBSTETRICS & GYNECOLOGY

## 2021-01-08 PROCEDURE — 99215 PR OFFICE/OUTPT VISIT, EST, LEVL V, 40-54 MIN: ICD-10-PCS | Mod: S$GLB,,, | Performed by: OBSTETRICS & GYNECOLOGY

## 2021-01-08 RX ORDER — SERTRALINE HYDROCHLORIDE 100 MG/1
100 TABLET, FILM COATED ORAL DAILY
Qty: 30 TABLET | Refills: 6 | Status: SHIPPED | OUTPATIENT
Start: 2021-01-08 | End: 2021-04-05 | Stop reason: SDUPTHER

## 2021-02-12 ENCOUNTER — PATIENT MESSAGE (OUTPATIENT)
Dept: OBSTETRICS AND GYNECOLOGY | Facility: CLINIC | Age: 26
End: 2021-02-12

## 2021-03-01 ENCOUNTER — PATIENT MESSAGE (OUTPATIENT)
Dept: OBSTETRICS AND GYNECOLOGY | Facility: CLINIC | Age: 26
End: 2021-03-01

## 2021-03-01 RX ORDER — FLUCONAZOLE 150 MG/1
150 TABLET ORAL ONCE
Qty: 2 TABLET | Refills: 1 | Status: SHIPPED | OUTPATIENT
Start: 2021-03-01 | End: 2021-03-01

## 2021-04-05 ENCOUNTER — PATIENT MESSAGE (OUTPATIENT)
Dept: OBSTETRICS AND GYNECOLOGY | Facility: CLINIC | Age: 26
End: 2021-04-05

## 2021-04-05 DIAGNOSIS — F32.A DEPRESSION, UNSPECIFIED DEPRESSION TYPE: ICD-10-CM

## 2021-04-05 RX ORDER — IBUPROFEN 600 MG/1
600 TABLET ORAL EVERY 6 HOURS PRN
Qty: 90 TABLET | Refills: 1 | Status: SHIPPED | OUTPATIENT
Start: 2021-04-05

## 2021-04-05 RX ORDER — LORATADINE 10 MG/1
10 TABLET ORAL DAILY PRN
Qty: 90 TABLET | Refills: 4 | Status: SHIPPED | OUTPATIENT
Start: 2021-04-05

## 2021-04-05 RX ORDER — NIFEDIPINE 30 MG/1
30 TABLET, FILM COATED, EXTENDED RELEASE ORAL DAILY
Qty: 90 TABLET | Refills: 4 | Status: SHIPPED | OUTPATIENT
Start: 2021-04-05

## 2021-04-05 RX ORDER — SERTRALINE HYDROCHLORIDE 100 MG/1
100 TABLET, FILM COATED ORAL DAILY
Qty: 90 TABLET | Refills: 4 | Status: SHIPPED | OUTPATIENT
Start: 2021-04-05 | End: 2022-04-05

## 2021-04-05 RX ORDER — FLUTICASONE PROPIONATE 50 MCG
1 SPRAY, SUSPENSION (ML) NASAL DAILY
Qty: 16 G | Refills: 2 | Status: SHIPPED | OUTPATIENT
Start: 2021-04-05

## 2021-04-26 ENCOUNTER — PATIENT MESSAGE (OUTPATIENT)
Dept: RESEARCH | Facility: HOSPITAL | Age: 26
End: 2021-04-26

## 2022-08-05 NOTE — CARE UPDATE
Based on discussion with MFM attending Dr. Denton after delivery, recommendation made for antibiotic prophylaxis x 24 hours after delivery. Will treat with ampicillin and flagyl for adequate anaerobic coverage. Continue expectant management of Twin B at this time. Pain well controlled.    Dee Denis M.D.  OB/GYN PGY-2   Pt scheduled for surgery and preop instructions including instructions for taking Famotidine and for Chlorhexidine use in showering on the day of surgery, given verbally and with use of  written materials, and patient confirming understanding of such instructions using  teach back method.  Pt to take all HTN meds in pm as usual night before surgery   Pt given information for COVID PCR testing sites and told to make appointment 3-5 days preop   OR [facility] notified of BMI 43.9 via fax

## 2025-03-28 ENCOUNTER — PATIENT MESSAGE (OUTPATIENT)
Dept: OBSTETRICS AND GYNECOLOGY | Facility: CLINIC | Age: 30
End: 2025-03-28

## 2025-03-28 ENCOUNTER — CLINICAL SUPPORT (OUTPATIENT)
Dept: OBSTETRICS AND GYNECOLOGY | Facility: CLINIC | Age: 30
End: 2025-03-28
Payer: COMMERCIAL

## 2025-03-28 DIAGNOSIS — N91.2 AMENORRHEA: Primary | ICD-10-CM

## 2025-03-28 PROCEDURE — 99999 PR PBB SHADOW E&M-NEW PATIENT-LVL II: CPT | Mod: PBBFAC,,,

## 2025-03-28 RX ORDER — CETIRIZINE HYDROCHLORIDE 10 MG/1
10 TABLET ORAL DAILY
COMMUNITY

## 2025-03-28 RX ORDER — MULTIVITAMIN
1 TABLET ORAL DAILY
COMMUNITY

## 2025-03-28 NOTE — PROGRESS NOTES
Spoke with patient for a total of 30 minutes during virtual visit.  Updated chart to reflect up to date patient demographics.  Allergies, medications, pharmacy, medical/family history and OB history updated.  Patient was guided through expectations of care during pregnancy.  Pregnancy confirmation, dating u/s & first routine OB appts scheduled.  Education provided & questions answered. Encouraged to send message or call office with any questions/concerns. Verbalized understanding.     Discussed with pt:    Lmp 2/17  Encouraged to begin taking PNV-currently takes multivitamin   denies n/v  C/o spitting  denies cramping/has had light spotting couple days ago  Precautions discussed  Referred to [email or web address] for Preg A to Z guide & class schedule  HTN-no meds; does not monitor bp-encouraged [employer]  Reports having cold-will send acceptable otc meds list as requested; drinks hot tea  Hx SAB x2, including twins

## 2025-04-14 ENCOUNTER — OFFICE VISIT (OUTPATIENT)
Dept: OBSTETRICS AND GYNECOLOGY | Facility: CLINIC | Age: 30
End: 2025-04-14

## 2025-04-14 ENCOUNTER — HOSPITAL ENCOUNTER (OUTPATIENT)
Dept: PERINATAL CARE | Facility: OTHER | Age: 30
Discharge: HOME | End: 2025-04-14
Attending: FAMILY MEDICINE

## 2025-04-14 VITALS
BODY MASS INDEX: 42.16 KG/M2 | DIASTOLIC BLOOD PRESSURE: 86 MMHG | HEIGHT: 64 IN | WEIGHT: 246.94 LBS | SYSTOLIC BLOOD PRESSURE: 136 MMHG

## 2025-04-14 DIAGNOSIS — Z36.82 ENCOUNTER FOR NUCHAL TRANSLUCENCY TESTING: Primary | ICD-10-CM

## 2025-04-14 DIAGNOSIS — Z32.01 POSITIVE PREGNANCY TEST: Primary | ICD-10-CM

## 2025-04-14 DIAGNOSIS — N91.4 SECONDARY OLIGOMENORRHEA: ICD-10-CM

## 2025-04-14 DIAGNOSIS — Z12.4 PAP SMEAR FOR CERVICAL CANCER SCREENING: ICD-10-CM

## 2025-04-14 DIAGNOSIS — O09.299 PRIOR PREGNANCY WITH FETAL DEMISE: ICD-10-CM

## 2025-04-14 DIAGNOSIS — N91.2 AMENORRHEA: ICD-10-CM

## 2025-04-14 LAB
CREAT UR-MCNC: 169.1 MG/DL (ref 15–325)
PROT UR-MCNC: 9 MG/DL
PROT/CREAT UR: 0.05 MG/G{CREAT}

## 2025-04-14 PROCEDURE — 84156 ASSAY OF PROTEIN URINE: CPT | Performed by: FAMILY MEDICINE

## 2025-04-14 PROCEDURE — 99999 PR PBB SHADOW E&M-EST. PATIENT-LVL III: CPT | Mod: PBBFAC,,, | Performed by: FAMILY MEDICINE

## 2025-04-14 PROCEDURE — 99213 OFFICE O/P EST LOW 20 MIN: CPT | Mod: PBBFAC,25 | Performed by: FAMILY MEDICINE

## 2025-04-14 PROCEDURE — 87591 N.GONORRHOEAE DNA AMP PROB: CPT | Performed by: FAMILY MEDICINE

## 2025-04-14 PROCEDURE — 88175 CYTOPATH C/V AUTO FLUID REDO: CPT | Performed by: FAMILY MEDICINE

## 2025-04-14 PROCEDURE — 99204 OFFICE O/P NEW MOD 45 MIN: CPT | Mod: S$PBB,,, | Performed by: FAMILY MEDICINE

## 2025-04-14 PROCEDURE — 76801 OB US < 14 WKS SINGLE FETUS: CPT

## 2025-04-14 PROCEDURE — 87086 URINE CULTURE/COLONY COUNT: CPT | Performed by: FAMILY MEDICINE

## 2025-04-14 RX ORDER — PRENATAL WITH FERROUS FUM AND FOLIC ACID 3080; 920; 120; 400; 22; 1.84; 3; 20; 10; 1; 12; 200; 27; 25; 2 [IU]/1; [IU]/1; MG/1; [IU]/1; MG/1; MG/1; MG/1; MG/1; MG/1; MG/1; UG/1; MG/1; MG/1; MG/1; MG/1
1 TABLET ORAL DAILY
Qty: 90 TABLET | Refills: 2 | Status: SHIPPED | OUTPATIENT
Start: 2025-04-14 | End: 2026-04-14

## 2025-04-14 NOTE — PROGRESS NOTES
HISTORY OF PRESENT ILLNESS:    Christina Stevens is a 29 y.o. female, ,  Patient's last menstrual period was 2025.  for a routine exam complaining of amenorrhea and positive home UPT.  SAB at 6w then  lost twin pregnancy, one twin at 15w and the other at 18w which was very traumatic for her. First pregnancy with new partner, he is healthy with two healthy children. This was an unplanned pregnancy and she is unsure of her feelings about continuing on with pregnancy given her hx. +HAs and fatigue. No NV. Had spotting once a week ago B POS. No pelvic pain. No abn pap, hx of gonorrhea. Works in security. Hx of cHTN trying to manage without medication.  This is the extent of the patient's complaints at this time.     Past Medical History:   Diagnosis Date    Hypertension        Past Surgical History:   Procedure Laterality Date    DILATION AND CURETTAGE OF UTERUS N/A 2020    Procedure: DILATION AND EVACUATION;  Surgeon: Deepti Bearden MD;  Location: [facility];  Service: OB/GYN;  Laterality: N/A;       MEDICATIONS AND ALLERGIES:    Current Medications[1]    Review of patient's allergies indicates:   Allergen Reactions    No known allergies        Family History   Problem Relation Name Age of Onset    Depression Mother      Hypertension Father      Diabetes Father      Breast cancer Neg Hx      Colon cancer Neg Hx      Ovarian cancer Neg Hx         Social History[2]    COMPREHENSIVE GYN HISTORY:  PAP History: Denies abnormal Paps.  Infection History: + STDs. Denies PID.  Benign History: Denies uterine fibroids. Denies ovarian cysts. Denies endometriosis. Denies other conditions.  Cancer History: Denies cervical cancer. Denies uterine cancer or hyperplasia. Denies ovarian cancer. Denies vulvar cancer or pre-cancer. Denies vaginal cancer or pre-cancer. Denies breast cancer. Denies colon cancer.  Sexual Activity History: Reports currently being sexually active  Menstrual History:  "None.  Contraception: None    ROS:  GENERAL: No weight changes. No swelling. + fatigue. No fever.  CARDIOVASCULAR: No chest pain. No shortness of breath. No leg cramps.   NEUROLOGICAL: + headaches. No vision changes.  BREASTS: No pain. No lumps. No discharge.  ABDOMEN: No pain. No nausea. No vomiting. No diarrhea. No constipation.  REPRODUCTIVE: No abnormal bleeding. No pelvic pain  VULVA: No pain. No lesions. No itching.  VAGINA: No relaxation. No itching. No odor. No discharge. No lesions.  URINARY: No incontinence. No nocturia. No frequency. No dysuria.    /86 (BP Location: Left arm, Patient Position: Sitting)   Ht 5' 4" (1.626 m)   Wt 112 kg (246 lb 14.6 oz)   LMP 02/17/2025   BMI 42.38 kg/m²     PE:  Physical Exam:   Constitutional: She appears well-developed and well-nourished. She does not appear ill. No distress.        Pulmonary/Chest: Right breast exhibits no inverted nipple, no mass, no nipple discharge, no skin change, no tenderness and no swelling. Left breast exhibits no inverted nipple, no mass, no nipple discharge, no skin change, no tenderness and no swelling.          Genitourinary:    Urethra and vagina normal.      Pelvic exam was performed with patient in the lithotomy position.   The external female genitalia was normal.   Genitalia hair distrobution normal .   There is no rash or lesion on the right labia. There is no rash or lesion on the left labia. Cervix is normal. No rectocele, cystocele or prolapse of vaginal walls in the vagina.    pap smear completedUterus is not tender. Normal urethral meatus.   Genitourinary Comments: Marcella mcgowan                 Neurological: GCS eye subscore is 4. GCS verbal subscore is 5. GCS motor subscore is 6.         PROCEDURES:  UPT Positive  Genprobe  Pap      DIAGNOSIS:  Gyn exam  IUP with stated LMP of Patient's last menstrual period was 02/17/2025.  Indication   ========   Indication: Estimation of Gestational Age     History   ====== "   Previous Outcomes    2   Para 0   Preg. no. 1   Outcome: miscarriage   Date: 2019   Gest. age 6 w + 0 d   Preg. no. 2   Outcome: miscarriage   Details: twins @ 15 w & 18w   Risk Factors   History risk factors: Obesity BMI >35   Details: BMI 42   Details: chtn ( taking meds , dx 2 years)     Method   ======   2D Color Doppler, [place] Expert 18, Transabdominal and transvaginal ultrasound examination. View: Good view     Pregnancy   =========   Bui pregnancy. Number of embryos: 1     Dating   ======   LMP on: 2025   GA by LMP 8 w + 0 d   GABINO by LMP: 2025   Ultrasound examination on: 2025   GA by U/S based upon: CRL   GA by U/S 8 w + 0 d   GABINO by U/S: 2025   Assigned: based on ultrasound (CRL), selected on 2025   Assigned GA 8 w + 0 d   Assigned GABINO: 2025     Assessment   ==========   Gestational sac: visualized   Location: intrauterine   Yolk sac: visualized   Amniotic sac: visualized   Embryo: visualized   CRL 15.7 mm 8w 0d [place]   Cardiac activity: present    bpm     Maternal Structures   ===============   Uterus / Cervix   Uterus: Normal   Cervix: Visualized   Approach: Transvaginal   Ovaries / Tubes / Adnexa   Rt ovary: Normal   Rt ovary D1 32 mm   Rt ovary D2 29 mm   Rt ovary D3 21 mm   Rt ovary Vol 10.1 cmï¿½   Lt ovary: Normal   Lt ovary D1 50 mm   Lt ovary D2 40 mm   Lt ovary D3 35 mm   Lt ovary Vol 36.4 cmï¿½   Lt ovarian corpus luteum: visualized   Lt ovarian corpus luteum D1 34.0 mm   Lt ovarian corpus luteum D2 30.0 mm   Lt ovarian corpus luteum D3 30.0 mm   Cul de Sac / Bladder / Kidneys / Other   Free fluid: No free fluid visualized     Impression   =========   A bui IUP with cardiac activity is identified. GABINO is assigned based on the CRL from today?s study. If other clinical data, such as IVF conception dating or prior   ultrasound assignment of GABINO differs from the GABINO assigned today, please contact the [facility] department so that this  report can be revised to reflect the correct GABINO.   The maternal adnexae are normal in appearance.     Limitations   =========   Please note: Prenatal ultrasound studies have limitations. They do not detect all fetal, genetic, placental, and maternal abnormalities. A normal appearing prenatal   ultrasound is reassuring. However, it does not guarantee the absence of an abnormality or predict a normal outcome for the fetus or the mother.                                                       Sonographer: Kathy Christianson   Electronically Signed by: Vicki Steven M.D. at 2025-10:24   PLAN:Routine prenatal care    MEDICATIONS PRESCRIBED:  PNV- sent rx    LABS AND TESTS ORDERED:  New Ob Labs      1st TRIMESTER COUNSELING: Discussed all, booklet provided  Common complaints of pregnancy  HIV and other routine prenatal tests including  genetic screening  Risk factors identified by prenatal history  Anticipated course of prenatal care  Nutrition and weight gain counseling  Toxoplasmosis precautions (Cats/Raw Meat)  Sexual activity and exercise  Environmental/Work hazards  Travel  Tobacco (Ask, Advise, Assess, Assist, and Arrange), as well as alcohol and drug use  Use of any medications (Including supplements, Vitamins, Herbs, or OTC Drugs)  Indications for Ultrasound  Domestic violence  Seat belt use  Childbirth classes/Hospital facilities     TERATOLOGY COUNSELING: Discussed options for SS, MT21 and carrier screening. Pt will let us know her desires. Discussed time constraints on SS      FOLLOW-UP for a New Ob Visit in   4   weeks with   -discussed to call clinic or L&D/ER if after hours for pain/bleeding  -baseline preE labs       [1]   Current Outpatient Medications:     cetirizine (ZYRTEC) 10 MG tablet, Take 10 mg by mouth once daily., Disp: , Rfl:     fluticasone propionate (FLONASE) 50 mcg/actuation nasal spray, 1 spray (50 mcg total) by Each Nostril route once daily., Disp: 16 g, Rfl: 2  [2]    Social History  Socioeconomic History    Marital status: Single   Occupational History    Occupation: security at [employer]   Tobacco Use    Smoking status: Never    Smokeless tobacco: Never   Substance and Sexual Activity    Alcohol use: Not Currently     Comment: Occasionally.    Drug use: No    Sexual activity: Yes     Partners: Male     Birth control/protection: None     Social Drivers of Health     Financial Resource Strain: Unknown (8/7/2019)    Received from [facility]    Overall Financial Resource Strain (CARDIA)     Difficulty of Paying Living Expenses: Patient declined   Food Insecurity: Unknown (8/7/2019)    Received from [facility]    Hunger Vital Sign     Worried About Running Out of Food in the Last Year: Patient declined     Ran Out of Food in the Last Year: Patient declined   Transportation Needs: Unknown (8/7/2019)    Received from [facility]    PRAPARE - Transportation     Lack of Transportation (Medical): Patient declined     Lack of Transportation (Non-Medical): Patient declined   Physical Activity: Unknown (8/7/2019)    Received from [facility]    Exercise Vital Sign     Days of Exercise per Week: Patient declined     Minutes of Exercise per Session: Patient declined   Stress: Unknown (8/7/2019)    Received from [facility]    [employer] - Occupational Stress Questionnaire     Feeling of Stress : Patient declined

## 2025-04-14 NOTE — PATIENT INSTRUCTIONS
LABOR AND DELIVERY PHONE NUMBER, [phone] (OPEN , LOCATED ON 6TH FLOOR OF HOSPITAL)  [address] PHONE NUMBER, [phone] (OPEN MON-FRI, 8a-5p)     1) Eat small frequent meals through the day versus three large meals  2) Try ginger ale or sprite to help settle the stomach - you can also take ginger capsules (250mg 4 times per day)  3) Eat crackers or dry toast before getting out of bed in the morning   4) Stay hydrated by drinking plenty of water-do not immediately eat or drink something after vomiting. Give your stomach a rest for 20-30 minutes. Slowly reintroduce ice chips, small sips water, crackers, etc.    5) Try OTC unisom (1/2 tablet) and vitamin B6 - take the 25mg b6 twice a day and then both together at night before bed. This can help with the nausea in the morning and is safe to use during pregnancy.  6) Sea bands (Accupressure wrist bands)    If you are unable to keep anything down and constantly vomiting for more that 24 hours, let the office know so that dehydration can be avoided. We would recommend being seen in the emergency department if this is the case.       Topic  General Pregnancy Information Recommended   (Unless Otherwise Contraindicated Or Restrictions Given To You By Your OB Doctor)      1. Anticipated course of prenatal care      Visits: will be Every 4 wks until 28 weeks, then every 2 weeks until 36 weeks, and then weekly until delivery.   Urine will be collected at each Obstetric visit        2. Nutrition and weight gain    Daily pre-andrew vitamin (recommend taking at night)   Additional 300 calories needed daily  No Sushi, hotdogs, unpasteurized products (milk/cheeses). No large fish such as: shark, kitty mackerel, tile, sword fish   Incorporate 12 ounces of smaller seafood/week and no more than 6oz of albacore tuna   Caffeine: 200 mg/day or 2 cups of caffeine/day   Weight gain recommendations are based off of BMI before pregnancy. Generally patients who with normal weight prior  pregnancy it is recommended 25-35 pounds of weight gain during the pregnancy with an estimated weekly gain of 1 pound/wk in 2nd and 3rd Trimester.    3. Toxoplasmosis precautions  If cats are in the home avoid changing litter boxes and if you need to change the litter box recommended you use gloves   4. Sexual Activity  Sexual activity is okay unless you are put on restrictions by your provider. I recommend urinating after intercourse    5. Exercise  Generally pre-pregnancy routine is okay to continue   Drink plenty fluids for hydration   Stop any activity that causes heavy cramping like a period or bright red bleeding and contact your provider  No extreme or contact sports   No exercise on your back for an extended period of time after 20 weeks    6. Hot Tub/Saunas  Avoid hot tubes and saunas    7. Hair Treatments  Because of the lack of scientific studies on the effects of chemical treatments on your hair, we must advise that you do it at your own risk. If you choose to treat your hair, we recommend that you wait until after 12 weeks gestation. At this time there is no reason to believe that normal hair treatment is associated with onsequences to the baby.    8. Vaccines  Influenza vaccine is recommended by CDC during flu season   Tdap (pertussis or whopping cough) recommended each pregnancy between 27 and 36 weeks   Tdap booster recommended for family and other planned direct caregivers    9. Water  Water is an important nutrient in a good diet. However, it cannot be stressed enough that during pregnancy water is essential. The body has increased circulation through blood vessels, and without a large increase in water, pregnant women will be dehydrated. It plays an important role in decreasing constipation, preventing  contractions, decreasing swelling, and preventing dizziness. We recommend that you drink 8-10 glasses of water per day.    10. Smoking/Alcohol/Illicit Drug Use  No safe Level   Can lead to  problems with pregnancy   Growth of the developing fetus    labor (delivery before 37 weeks)    rupture of the membranes (water breaking before 37 weeks)   Premature separation of the placenta (which may cause bleeding)   American College of Obstetricians and Gynecologists endorses abstinence   Can lead to babies with disabilities    11. Environmental or work hazards  Unless otherwise restricted you may continue work throughout the pregnancy   Notify your provider of any work hazards or chemical exposure concerns   12. Travel    Safe to travel up to 35 weeks   Continue to wear a seatbelt and airbags are still recommended   Drink plenty fluids   Blood clots are a concern during pregnancy with long travel. Recommend compression stockings and moving around at least every 2 hours and staying hydrated.    13. Use of medications, vitamins, herbs, OTC drugs    Any medications not on the list provided to you from our clinic or given to you by one of our providers we recommend calling to make sure the medication is safe for you and baby.    14. Domestic Violence    Please notify office immediately of any concerns or violence so that we can help direct you to assistance needed   [place] [employer]: [phone]    15. Childbirth classes    List of Childbirth classes from [facility] is available    16. Selecting a Pediatrician  Selecting a pediatrician before delivery is recommended  You can interview pediatricians before delivery    17. Fetal Monitoring    A simple test of your babys well-being is a kick count. After 26 weeks, fetal motion of any kind should be monitored. Further discussion at that time   18.  Labor Signs    Water break, leaking fluids from Vagina prior 37 weeks  Regular contractions, Contractions that are more than 5-6/hour, getting stronger and painful with lower back pain, does not go away with rest and fluids    19. Postpartum Family Planning    Multiple  options available from short term methods to long term reversible and irreversible methods   Discuss with provider as you get closer to delivery    20. Dental    It is recommended that you get an annual dental cleaning    21. Breastfeeding    Classes offered at [facility] and it is recommended to take a class    22. Lifting In 2013, the National [place] for Occupational Safety and Health (NIOSH) published clinical guidelines for occupational lifting in uncomplicated pregnancies. The recommended weight limits are based on gestational age, intermittent versus repetitive lifting, time (hours/day) spent lifting, and lifting height from floor and distance in 3 front of body. In this guideline, the maximum permissible weight for a woman less than 20 weeks of gestation performing infrequent lifting is 36 pounds (16 kgs) and the maximum permissible weight at >=20 weeks is 26 pounds (12 kgs). For repetitive lifting >=1 hour/day, the maximum weights in the first and second half of pregnancy are 18 pounds (8 kgs) and 13 pounds (6 kgs), respectively, and for repetitive lifting <1 hour/day, the maximum weights are 30 pounds (14 kgs) and 22 pounds (10 kgs), respectively. Although not based on high quality evidence, these guidelines are a reasonable reference for counseling pregnant women     23. Scheduling and Provider Availability    Your Obstetric Doctor is usually here weekly but not every day. We recommend you make 3-4 advanced appointments at a time to accommodate your personal needs and work/school obligations.   We ask that you come 15 minutes prior your scheduled appointment.   For same day appointments (not routine appointments) there is a Nurse Practitioner or another obstetric provider available. Please let the  aware you are an OB patient requesting a same day appointment.      24. Recommended Phone Joaquín    [facility]      MEDICATIONS IN PREGNANCY     While some medications are considered safe to take  during pregnancy, the effects of other medications on your unborn baby are unknown. Therefore, it is very important to pay special attention to medications you take while you are pregnant.   If you were taking prescription medications before you became pregnant, please ask your health care provider about continuing these medications as soon as you find out that you are pregnant. Do not stop taking any medications without discussing with your health care provider. Your health care provider will weigh the benefits and risks to your pregnancy when making his or her recommendation. With some medications, the risk of not taking them may be more serious than the potential risk of taking them.   If you are prescribed any new medication, please inform your health care provider that you are pregnant. Be sure to discuss the risks and benefits of the newly prescribed medication with your health care provider before taking the medication. We are always available to answer any questions about new medications and how they relate to your pregnancy.     Are Alternative Pregnancy Medicine Therapies Safe?   Many pregnant women believe natural products can be safely used to relieve nausea, backache, and other annoying symptoms of pregnancy, but many of these so-called natural products have not been tested for their safety and effectiveness. Therefore, it is very important to check with your health care provider before taking any alternative therapies. She will not recommend a product or therapy until it is shown to be safe and effective.     Which Over the Counter Drugs Are Safe?   Prenatal vitamins, now available without a prescription, are safe and important to take during pregnancy. Ask your health care provider about the safety of taking other vitamins, herbal remedies and supplements during pregnancy. Most herbal preparations and supplements have not been proven to be safe during pregnancy. Generally, you should not take any  over-the-counter medication unless it is necessary. The following medications and home remedies have no known harmful effects during pregnancy when taken according to the package directions. If you want to know about the safety of any other medications not listed here, please contact your health care provider.      Problem:  Safe to Take:    Pain relief, headache, and fever  Acetaminophen - Tylenol, Anacin Aspirin-Free    Heartburn  Acid neutralizers - Maalox, Mylanta, Rolaids, Tums, Gaviscon   Histamine-blockers - Pepcid, Zantac, Prilosec    Gas pains and bloating  Simethicone - Gas-X, Maalox Anti-Gas, Mylanta Gas, Mylicon    Nausea  Rosy - beverages, tablets, candies   Vitamin B6   Emetrol (if not diabetic)   Sea bands   Anti-histamines - Sleep-tamara, Benadryl, Bonnine, Dramamine    Cough  Guaifenesin (expectorant) - Hytuss, Mucinex, Robitussin   Dextromethorphan (antitussive) - Benylin, Delsym, Scot-Tussin DM   Guaifenesin plus dextromethorphan - Benylin Expectorant, Robitussin DM    Congestion  Pseudoephedrine - Sudafed, Actifed, Dristan, Neosynephrine   Vicks VapoRub   Saline nasal drops or spray    Sore throat  Throat lozenges - Sucrets, Cepacol, Cepastat, Ricola   Chloroseptic Spray   Warm salt/water gargle    Allergy relief  Chlorpheniramine - Chlor-Trimeton, Triaminic   Loratadine - Alavert, Claritin, Tavist ND, Triaminic Allerchews   Cetirizine - Zyrtec   Diphenhydramine -Benadryl, Diphenhist    Rashes  Hydrocortisone cream or ointment   Caladryl lotion or cream   Benadryl cream   Oatmeal bath (Aveeno)    Diarrhea  Loperamide - Imodium, Kaopectate, Maalox Anti-Diarrheal, Pepto Bismol    Constipation  Fiber supplements - Metamucil, Citrucel, Fiberall/Fibercon, Benefiber   Stool softeners - Colace, Senekot, Dulcolax   Milk of Magnesia    Hemorrhoids  Warm baths   Witch hazel preparations - Tucks medicated pads   Steroid preparations - Anusol-HC, Preparation H    Insomnia  Diphenhydramine - Benadryl, Unisom  SleepGels, Nytol, Sominex   Doxylamine succinate - Unisom Nighttime Sleep-Aid    First-aid ointments  Cortaid, Lanacort, Polysporin, Bacitracin, Neosporin    Yeast infections  Call office for appointment      Connected MOM   Using Wireless Technology to Manage Your Prenatal Health   Congratulations! Your team here at [facility] is excited for the upcoming addition to your family and is ready to support you over the course of your pregnancy. One of the ways we are prepared to help you is through our exciting new Digital Medicine Program, Connected MOM. Connected MOM stands for Connected Maternity Online Monitoring and is offered free of charge as a way to help our expectant mothers manage their pregnancy with fewer visits to the obstetrician.    In the fast-paced environment we live in, patients demand convenient, reliable access to healthcare at their fingertips. Recommended by The American College of Obstetricians and Gynecologists (ACOG), the standard prenatal care model for low-risk pregnancies can average anywhere between 12-14 in-office prenatal visits.    Through this innovative program, participating mothers-to-be receive a wireless scale, wireless blood pressure cuff and an at-home urine protein test kit. Through the use of a smartphone, patients can easily send their weight, blood pressure readings and urine protein test results digitally in real-time from the comfort of their own homes. Results are sent directly to their [facility] account, the systems online patient portal which connects directly to the patients Electronic Medical Record. A specialized Connected MOM care team - comprised of the patients obstetrician, a dedicated health  and a  - reviews the data and provides medical recommendations as needed. This allows expectant mothers to receive care proactively, wherever they are, while minimizing the need for in-person visits and thus minimizing  disruption to their regular daily activities.    How it Works At the initial prenatal visit, interested patients can work with their obstetrician to sign up for the program. After the appointment, expectant mothers can head to the [facility], a first-of-its-kind retail experience created by [facility] offering the latest in cutting-edge, interactive healthcare technology, to receive a Connected MOM kit containing all of the digital tools needed for remote monitoring. Once enrolled, patients weigh themselves regularly and take their blood pressure once a week. Instead of coming to three office appointments at weeks 20, 30, 37 the patient will have the appointment at home. They will take their blood pressure, weight and perform three at-home urine protein tests at these home visits. Results are directly transmitted into the EMR, and notifications and messages from the care team are communicated via [facility].     TECH SUPPORT [phone]  [email or web address]      Chromosomal testing  The sequential screen is a test done around 11-13 weeks that consists of two sets of blood work (second set of labs done a few weeks later after 15 weeks). Based on both of these results, they are able to tell you if you are considered to be low risk or high risk regarding neural tube defects and chromosomal abnormalities like Down Syndrome and Trisomy 18. If you are at all interested, I usually place the order today so we do not miss the window. This test is typically covered by your insurance. It will not tell you the sex of the baby.     OR     2.  Call [phone] to see about coverage and any out of pocket costs regarding the [id number] (MT21) testing. This is done any day after 10 weeks and is blood work only. It checks for any chromosomal abnormalities like Down Syndrome. You can also find out the sex of the baby if you choose to know. Once you find out coverage and decide to proceed, send either myself or your doctor a  message and we can see what date you can do it. It is done at our second floor lab at [facility].

## 2025-04-15 ENCOUNTER — RESULTS FOLLOW-UP (OUTPATIENT)
Dept: OBSTETRICS AND GYNECOLOGY | Facility: CLINIC | Age: 30
End: 2025-04-15

## 2025-04-16 LAB — BACTERIA UR CULT: NO GROWTH

## 2025-04-19 LAB
C TRACH DNA SPEC QL NAA+PROBE: NOT DETECTED
CTGC SOURCE (OHS) ORD-325: NORMAL
N GONORRHOEA DNA UR QL NAA+PROBE: NOT DETECTED

## 2025-05-11 PROBLEM — O46.90 VAGINAL BLEEDING IN PREGNANCY: Status: RESOLVED | Noted: 2020-12-07 | Resolved: 2025-05-11

## 2025-05-11 PROBLEM — O20.0 THREATENED ABORTION IN SECOND TRIMESTER: Status: RESOLVED | Noted: 2020-11-16 | Resolved: 2025-05-11

## 2025-05-11 PROBLEM — O41.1290 CHORIOAMNIONITIS: Status: RESOLVED | Noted: 2020-12-08 | Resolved: 2025-05-11

## 2025-05-11 PROBLEM — Z87.59 HISTORY OF FETAL LOSS: Status: ACTIVE | Noted: 2025-05-11

## 2025-05-11 PROBLEM — Z98.890 S/P DILATION AND CURETTAGE: Status: RESOLVED | Noted: 2020-12-07 | Resolved: 2025-05-11

## 2025-05-11 PROBLEM — O30.042 DICHORIONIC DIAMNIOTIC TWIN PREGNANCY IN SECOND TRIMESTER: Status: RESOLVED | Noted: 2020-10-27 | Resolved: 2025-05-11
